# Patient Record
Sex: FEMALE | Race: WHITE | Employment: OTHER | URBAN - METROPOLITAN AREA
[De-identification: names, ages, dates, MRNs, and addresses within clinical notes are randomized per-mention and may not be internally consistent; named-entity substitution may affect disease eponyms.]

---

## 2020-09-28 ENCOUNTER — HOSPITAL ENCOUNTER (OUTPATIENT)
Dept: CT IMAGING | Age: 70
Discharge: HOME | End: 2020-09-28
Payer: MEDICARE

## 2020-09-28 PROCEDURE — 74176 CT ABD & PELVIS W/O CONTRAST: CPT

## 2021-01-06 ENCOUNTER — HOSPITAL ENCOUNTER (OUTPATIENT)
Age: 71
Discharge: HOME | End: 2021-01-06
Payer: MEDICARE

## 2021-01-06 ENCOUNTER — HOSPITAL ENCOUNTER (OUTPATIENT)
Dept: ULTRASOUND IMAGING | Age: 71
Discharge: HOME | End: 2021-01-06
Payer: MEDICARE

## 2021-01-06 DIAGNOSIS — N20.0 CALCULUS OF KIDNEY: ICD-10-CM

## 2021-01-06 LAB
ANION GAP SERPL CALCULATED.3IONS-SCNC: 13 MMOL/L (ref 3–16)
BUN BLDV-MCNC: 16 MG/DL (ref 7–20)
CALCIUM SERPL-MCNC: 9.2 MG/DL (ref 8.3–10.6)
CHLORIDE BLD-SCNC: 99 MMOL/L (ref 99–110)
CO2: 26 MMOL/L (ref 21–32)
CREAT SERPL-MCNC: <0.5 MG/DL (ref 0.6–1.2)
GFR AFRICAN AMERICAN: >60
GFR NON-AFRICAN AMERICAN: >60
GLUCOSE BLD-MCNC: 193 MG/DL (ref 70–99)
POTASSIUM SERPL-SCNC: 3.7 MMOL/L (ref 3.5–5.1)
SODIUM BLD-SCNC: 138 MMOL/L (ref 136–145)

## 2021-01-06 PROCEDURE — 80048 BASIC METABOLIC PNL TOTAL CA: CPT

## 2021-01-06 PROCEDURE — 36415 COLL VENOUS BLD VENIPUNCTURE: CPT

## 2021-01-06 PROCEDURE — 76770 US EXAM ABDO BACK WALL COMP: CPT

## 2022-01-26 ENCOUNTER — APPOINTMENT (OUTPATIENT)
Dept: GENERAL RADIOLOGY | Age: 72
End: 2022-01-26
Payer: MEDICARE

## 2022-01-26 ENCOUNTER — HOSPITAL ENCOUNTER (EMERGENCY)
Age: 72
Discharge: HOME | End: 2022-01-26
Attending: EMERGENCY MEDICINE
Payer: MEDICARE

## 2022-01-26 VITALS
HEIGHT: 62 IN | BODY MASS INDEX: 41.87 KG/M2 | WEIGHT: 227.51 LBS | RESPIRATION RATE: 18 BRPM | OXYGEN SATURATION: 98 % | HEART RATE: 94 BPM | SYSTOLIC BLOOD PRESSURE: 141 MMHG | TEMPERATURE: 97.9 F | DIASTOLIC BLOOD PRESSURE: 86 MMHG

## 2022-01-26 DIAGNOSIS — S61.411A LACERATION OF MULTIPLE SITES OF RIGHT HAND AND FINGERS, INITIAL ENCOUNTER: ICD-10-CM
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
## 2022-01-26 DIAGNOSIS — S61.219A LACERATION OF MULTIPLE SITES OF RIGHT HAND AND FINGERS, INITIAL ENCOUNTER: ICD-10-CM

## 2022-01-26 PROCEDURE — 6370000000 HC RX 637 (ALT 250 FOR IP): Performed by: EMERGENCY MEDICINE

## 2022-01-26 PROCEDURE — 12002 RPR S/N/AX/GEN/TRNK2.6-7.5CM: CPT

## 2022-01-26 PROCEDURE — 73130 X-RAY EXAM OF HAND: CPT

## 2022-01-26 PROCEDURE — 99283 EMERGENCY DEPT VISIT LOW MDM: CPT

## 2022-01-26 RX ORDER — LISINOPRIL 30 MG/1
30 TABLET ORAL DAILY
COMMUNITY
Start: 2021-12-30

## 2022-01-26 RX ORDER — AMOXICILLIN AND CLAVULANATE POTASSIUM 875; 125 MG/1; MG/1
1 TABLET, FILM COATED ORAL 2 TIMES DAILY
Qty: 20 TABLET | Refills: 0 | Status: SHIPPED | OUTPATIENT
Start: 2022-01-26 | End: 2022-02-05

## 2022-01-26 RX ORDER — FERROUS SULFATE 325(65) MG
325 TABLET ORAL
COMMUNITY
Start: 2021-11-26

## 2022-01-26 RX ORDER — ERGOCALCIFEROL (VITAMIN D2) 1250 MCG
CAPSULE ORAL
COMMUNITY
Start: 2022-01-17

## 2022-01-26 RX ORDER — ACETAMINOPHEN 500 MG
1000 TABLET ORAL EVERY 6 HOURS PRN
COMMUNITY

## 2022-01-26 RX ORDER — GLIPIZIDE 5 MG/1
5 TABLET, FILM COATED, EXTENDED RELEASE ORAL
COMMUNITY
Start: 2021-09-09

## 2022-01-26 RX ORDER — HYDROXYCHLOROQUINE SULFATE 200 MG/1
200 TABLET, FILM COATED ORAL 2 TIMES DAILY
COMMUNITY
Start: 2021-10-05

## 2022-01-26 RX ORDER — GLIPIZIDE 10 MG/1
10 TABLET, FILM COATED, EXTENDED RELEASE ORAL DAILY
COMMUNITY
Start: 2021-12-14

## 2022-01-26 RX ORDER — AMLODIPINE BESYLATE 5 MG/1
5 TABLET ORAL DAILY
COMMUNITY
Start: 2021-12-14

## 2022-01-26 RX ORDER — METFORMIN HYDROCHLORIDE 500 MG/1
TABLET, EXTENDED RELEASE ORAL
COMMUNITY
Start: 2021-11-22

## 2022-01-26 RX ORDER — AMOXICILLIN AND CLAVULANATE POTASSIUM 875; 125 MG/1; MG/1
1 TABLET, FILM COATED ORAL ONCE
Status: COMPLETED | OUTPATIENT
Start: 2022-01-26 | End: 2022-01-26

## 2022-01-26 RX ORDER — OMEPRAZOLE 20 MG/1
20 CAPSULE, DELAYED RELEASE ORAL DAILY
COMMUNITY
Start: 2021-12-30

## 2022-01-26 RX ORDER — AMOXICILLIN AND CLAVULANATE POTASSIUM 875; 125 MG/1; MG/1
1 TABLET, FILM COATED ORAL EVERY 12 HOURS SCHEDULED
Status: DISCONTINUED | OUTPATIENT
Start: 2022-01-26 | End: 2022-01-26

## 2022-01-26 RX ADMIN — AMOXICILLIN AND CLAVULANATE POTASSIUM 1 TABLET: 875; 125 TABLET, FILM COATED ORAL at 18:04

## 2022-01-26 ASSESSMENT — ENCOUNTER SYMPTOMS
GASTROINTESTINAL NEGATIVE: 1
ABDOMINAL PAIN: 0
SHORTNESS OF BREATH: 0
RESPIRATORY NEGATIVE: 1
SORE THROAT: 0

## 2022-01-26 ASSESSMENT — PAIN SCALES - GENERAL: PAINLEVEL_OUTOF10: 4

## 2022-01-26 ASSESSMENT — PAIN DESCRIPTION - ORIENTATION: ORIENTATION: RIGHT

## 2022-01-26 ASSESSMENT — PAIN DESCRIPTION - DESCRIPTORS: DESCRIPTORS: ACHING

## 2022-01-26 ASSESSMENT — PAIN DESCRIPTION - LOCATION: LOCATION: HAND

## 2022-01-26 ASSESSMENT — PAIN DESCRIPTION - PAIN TYPE: TYPE: ACUTE PAIN

## 2022-01-26 NOTE — ED NOTES
Laceration cleaned out with copious amounts of irrigation saline and surgical soap per MD instruction. Patient tolerates well.      William English RN  01/26/22 9821

## 2022-01-26 NOTE — ED NOTES
Patient ambulatory from ED. AVS provided and discussed with patient. All questions answered. Patient verbalizes understanding of discharge instructions. Respirations even and easy. No obvious distress at this time. Patient advised to take full course of antibiotics as prescribed, even if symptoms begin to subside. Patient verbalizes understanding.        Rosalie Marina RN  01/26/22 0027

## 2022-01-26 NOTE — ED TRIAGE NOTES
Patient presents to ED complaining of dog bite to right hand, patient refered to ED by Urgent Care. Urgent Care provider states via phone she is unsure if there is tendon involvement or not. MD to bedside for evaluation. Patient states she is up to date on her tetanus and that the dog in question is up to date as well. Multiple puncture wounds noted to right hand. Bleeding controlled on arrival without intervention. Patient resting on bed, respirations even and easy at this time. No obvious distress.

## 2022-01-26 NOTE — ED PROVIDER NOTES
[id number] [facility]  EMERGENCY DEPARTMENTENCOUNTER      Pt Name: Apoorva Mendez  MRN: [id number]  [place] 1950  Date ofevaluation: 1/26/2022  Provider: Chelo Santos MD    CHIEF COMPLAINT       Chief Complaint   Patient presents with    Animal Bite     dog bite to right hand, pateitn refered to ED by Urgent Care. States she is up to date on her shots and that the dog in question is up to date as well. HISTORY OF PRESENT ILLNESS   (Location/Symptom, Timing/Onset,Context/Setting, Quality, Duration, Modifying Factors, Severity)  Note limiting factors. Apoorva Mendez is a 80 y.o. female  who  has a past medical history of Arthritis, COVID-19, Diabetes mellitus ([address] Hyperlipidemia, and Hypertension. 69-year-old female who presents from urgent care after a dog bite which occurred 30 minutes prior to arrival.  Patient having some pain at the site. Symptoms occurred suddenly. Bleeding is now resolved with pressure. Nothing else seems to make her symptoms better. Worse with moving her hand but has no weakness or numbness on her hand. Urgent care sent her here because they were concerned for tendon involvement. No other modifying factors. No other known risk factors. Dog is domesticated no concern for rabies. Patient's tetanus is up-to-date. The history is provided by the patient. No  was used. Animal Bite  Contact animal:  Dog  Time since incident:  30 minutes  Pain details:     Quality:  Aching    Severity:  Mild    Timing:  Constant    Progression:  Improving  Incident location:  Home  Provoked: unprovoked    Tetanus status:  Up to date  Relieved by: Compression. Exacerbated by: Movement. Associated symptoms comment:  None      NursingNotes were reviewed. REVIEW OF SYSTEMS    (2-9 systems for level 4, 10 or more for level 5)     Review of Systems   Constitutional: Negative. HENT: Negative for congestion and sore throat. Respiratory: Negative. Negative for shortness of breath. Cardiovascular: Negative. Negative for chest pain. Gastrointestinal: Negative. Negative for abdominal pain. Genitourinary: Negative. Skin: Positive for wound. Neurological: Negative. Negative for headaches. Hematological: Does not bruise/bleed easily. Except as noted above the remainder of the review of systems was reviewed and negative. PAST MEDICAL HISTORY     Past Medical History:   Diagnosis Date    Arthritis     Reumitoid    COVID-19     Diabetes mellitus ([facility] [address]     Hyperlipidemia     Hypertension          SURGICALHISTORY     No past surgical history on file. CURRENT MEDICATIONS       Previous Medications    ACETAMINOPHEN (TYLENOL) 500 MG TABLET    Take 1,000 mg by mouth every 6 hours as needed    AMLODIPINE (NORVASC) 5 MG TABLET    Take 5 mg by mouth daily    ERGOCALCIFEROL (ERGOCALCIFEROL) 1.25 MG (21496 UT) CAPSULE    TAKE 1 CAPSULE BY MOUTH 1 TIME A WEEK    FERROUS SULFATE (IRON 325) 325 (65 FE) MG TABLET    Take 325 mg by mouth daily (with breakfast)    GLIPIZIDE (GLUCOTROL XL) 10 MG EXTENDED RELEASE TABLET    Take 10 mg by mouth daily    GLIPIZIDE (GLUCOTROL XL) 5 MG EXTENDED RELEASE TABLET    Take 5 mg by mouth daily (with breakfast)    HYDROXYCHLOROQUINE (PLAQUENIL) 200 MG TABLET    Take 200 mg by mouth 2 times daily    LISINOPRIL (PRINIVIL;ZESTRIL) 30 MG TABLET    Take 30 mg by mouth daily    METFORMIN (GLUCOPHAGE-XR) 500 MG EXTENDED RELEASE TABLET    TAKE 2 TABLETS BY MOUTH TWICE DAILY    OMEPRAZOLE (PRILOSEC) 20 MG DELAYED RELEASE CAPSULE    Take 20 mg by mouth daily            Patient has no known allergies. FAMILY HISTORY     No family history on file.        SOCIAL HISTORY       Social History     Socioeconomic History    Marital status: Single     Spouse name: Not on file    Number of children: Not on file    Years of education: Not on file    Highest education level: Not on file   Occupational History    Not on file   Tobacco Use    Smoking status: Never Smoker    Smokeless tobacco: Never Used   Vaping Use    Vaping Use: Never used   Substance and Sexual Activity    Alcohol use: Never    Drug use: Never    Sexual activity: Not on file   Other Topics Concern    Not on file   Social History Narrative    Not on file     Social Determinants of Health     Financial Resource Strain:     Difficulty of Paying Living Expenses: Not on file   Food Insecurity:     Worried About Running Out of Food in the Last Year: Not on file    Imtiaz of Food in the Last Year: Not on file   Transportation Needs:     Lack of Transportation (Medical): Not on file    Lack of Transportation (Non-Medical): Not on file   Physical Activity:     Days of Exercise per Week: Not on file    Minutes of Exercise per Session: Not on file   Stress:     Feeling of Stress : Not on file   Social Connections:     Frequency of Communication with Friends and Family: Not on file    Frequency of Social Gatherings with Friends and Family: Not on file    Attends Mandaen Services: Not on file    Active Member of [address] [place] [address] or Organizations: Not on file    Attends Club or Organization Meetings: Not on file    Marital Status: Not on file   Intimate Partner Violence:     Fear of Current or Ex-Partner: Not on file    Emotionally Abused: Not on file    Physically Abused: Not on file    Sexually Abused: Not on file   Housing Stability:     Unable to Pay for Housing in the Last Year: Not on file    Number of [place] in the Last Year: Not on file    Unstable Housing in the Last Year: Not on file       SCREENINGS             PHYSICAL EXAM    (up to 7 for level 4, 8 or more for level 5)     ED Triage Vitals   BP Temp Temp src Pulse Resp SpO2 Height Weight   -- -- -- -- -- -- -- --       Physical Exam  Vitals and nursing note reviewed. Constitutional:       General: She is not in acute distress. Appearance: Normal appearance.  She is not ill-appearing, toxic-appearing or diaphoretic. HENT:      Head: Normocephalic and atraumatic. Mouth/Throat:      Mouth: Mucous membranes are moist.   Eyes:      Extraocular Movements: Extraocular movements intact. Cardiovascular:      Rate and Rhythm: Normal rate. Pulses: Normal pulses. Pulmonary:      Effort: Pulmonary effort is normal.   Abdominal:      Palpations: Abdomen is soft. Tenderness: There is no abdominal tenderness. Musculoskeletal:      Right hand: Laceration present. No swelling, deformity, tenderness or bony tenderness. Normal range of motion. Normal strength. Normal sensation. There is no disruption of two-point discrimination. Normal capillary refill. Normal pulse. Left hand: No swelling, deformity, lacerations, tenderness or bony tenderness. Normal range of motion. Normal strength. Normal sensation. There is no disruption of two-point discrimination. Normal capillary refill. Normal pulse. Arms:       Comments: Irregular 3 cm laceration to the right hand with some exposed fat. No exposed bone or tendon. Normal strength and sensation in bilateral hands. Normal opposition on the right hand. normal sensation in all nerve distributions. Cap refill normal.   Skin:     General: Skin is warm and dry. Capillary Refill: Capillary refill takes less than 2 seconds. Neurological:      General: No focal deficit present. Mental Status: She is alert. Psychiatric:         Mood and Affect: Mood normal.         RESULTS       RADIOLOGY:   Non-plain filmimages such as CT, Ultrasound and MRI are read by the radiologist.     Interpretation per the Radiologist below, if available at the time ofthis note:    XR HAND RIGHT (MIN 3 VIEWS)   Final Result   No acute osseous injury or radiopaque foreign body.                ED BEDSIDE ULTRASOUND:   Performed by ED Physician - none    LABS:  Labs Reviewed - No data to display    All other labs were within normal range or not returned as of this dictation. EMERGENCY DEPARTMENT COURSE and DIFFERENTIAL DIAGNOSIS/MDM:   Vitals:    Vitals:    01/26/22 1651   BP: (!) 148/92   Pulse: 99   Resp: 16   Temp: 97.9 °F (36.6 °C)   TempSrc: Oral   SpO2: 99%   Weight: 227 lb 8.2 oz (103.2 kg)   Height: 5' 2\" (1.575 m)       Patient was given thefollowing medications:  Medications   amoxicillin-clavulanate (AUGMENTIN) 875-125 MG per tablet 1 tablet (has no administration in time range)       ED COURSE & MEDICAL DECISION MAKING    Pertinent Labs & Imaging studies reviewed. (See chart for details)   -  Patient seen and evaluated in the emergency department. -  Triage and nursing notes reviewed and incorporated. -  Old chart records reviewed and incorporated. -  Differential diagnosis includes: We discussed the issue of likelihood of rabies in the offending animal and risk/benefits of the rabies vaccination. I explained to the patient that there have been no incidences of rabies from dog bites in [place], and that vaccination is currently not recommended by the Health Department. Differential diagnosis: Cellulitis, abscess, foreign body retention (tooth), tetanus, rabies, deep space infection, osteomyelitis, septic joint, other       Patient with laceration of the right hand from a dog bite. Tendons intact. Neurovascularly intact. Normal cap refill. Normal sensation. Normal strength in the hand. We will closed loosely as patient is at high risk of infection however will require some closure as wound is significantly irregular with some exposed subcutaneous fat. Will give patient prophylactic antibiotics as well as instructions to follow-up with hand clinic. See laceration note for further details of procedure and closure. Patient is afebrile not tachycardic saturating well on room air.    -  Work-up included:  See above  -  ED treatment included: See above  -  Results discussed with patient.           REASSESSMENT      The patient is at low risk for mortality based on demographic, history and clinical factors. Given the best available information and clinical assessment, I estimate the risk of hospitalization to be greater than risk of treatment at home. I have explained to the patient that the risk could rapidly change, given precautions for return and instructions. Explained to patient that the risk for mortality is low based on demographic, history and clinical factors. I discussed with patient the results of evaluation in the ED, diagnosis, care, and prognosis. The plan is to discharge to home. Patient is in agreement with plan and questions have been answered. I also discussed with patient the reasons which may require a return visit and the importance of follow-up care. The patient is well-appearing, nontoxic, and improved at the time of discharge. Patient agrees to call to arrange follow-up care as directed. Patient understands to return immediately for worsening/change in symptoms. CRITICAL CARE TIME   Total Critical Care time was 15 minutes, excluding separately reportable procedures. There was a high probability of clinically significant/life threatening deterioration in the patient's condition which required my urgent intervention. CONSULTS:  None    PROCEDURES:  Unless otherwise noted below, none     Lac Repair    Date/Time: 1/26/2022 5:46 PM  Performed by: Mahamed Elmore MD  Authorized by: Mahamed Elmore MD     Consent:     Consent obtained:  Verbal    Consent given by:  Patient    Risks discussed:  Infection, need for additional repair, nerve damage, poor cosmetic result, pain, poor wound healing, tendon damage and vascular damage    Alternatives discussed:  Delayed treatment  Anesthesia (see MAR for exact dosages):      Anesthesia method:  Local infiltration    Local anesthetic:  Lidocaine 1% w/o epi  Laceration details:     Location:  Hand    Hand location:  R hand, dorsum    Length (cm):  3    Depth (mm):  3  Repair type:     Repair type: Intermediate  Pre-procedure details:     Preparation:  Patient was prepped and draped in usual sterile fashion  Exploration:     Hemostasis achieved with:  Direct pressure    Wound extent: areolar tissue violated      Wound extent: no fascia violation noted, no foreign bodies/material noted, no muscle damage noted, no nerve damage noted, no tendon damage noted, no underlying fracture noted and no vascular damage noted      Contaminated: no    Treatment:     Area cleansed with:  Hibiclens    Amount of cleaning:  Extensive    Irrigation solution:  Tap water    Irrigation method:  Tap    Visualized foreign bodies/material removed: no    Skin repair:     Repair method:  Sutures    Suture size:  5-0    Suture material:  Prolene    Suture technique:  Simple interrupted    Number of sutures:  5  Approximation:     Approximation:  Loose  Post-procedure details:     Dressing:  Non-adherent dressing    Patient tolerance of procedure: Tolerated well, no immediate complications        FINAL IMPRESSION      1. Dog bite, initial encounter    2.  Laceration of multiple sites of right hand and fingers, initial encounter          DISPOSITION/PLAN   DISPOSITION        PATIENT REFERREDTO:  Carlita Sales MD  [address] [place] [address]  [phone]    Schedule an appointment as soon as possible for a visit         DISCHARGEMEDICATIONS:  New Prescriptions    AMOXICILLIN-CLAVULANATE (AUGMENTIN) 875-125 MG PER TABLET    Take 1 tablet by mouth 2 times daily for 10 days          (Please note that portions of this note were completed with a voice recognition program.  Efforts were made to edit the dictations but occasionally words are mis-transcribed.)    Ana Soni MD (electronically signed)  Attending Emergency Physician         Ana Soni MD  01/26/22 9543

## 2022-12-22 ENCOUNTER — HOSPITAL ENCOUNTER (EMERGENCY)
Age: 72
Discharge: HOME | End: 2022-12-22
Payer: MEDICARE

## 2022-12-22 ENCOUNTER — APPOINTMENT (OUTPATIENT)
Dept: GENERAL RADIOLOGY | Age: 72
End: 2022-12-22
Payer: MEDICARE

## 2022-12-22 VITALS
OXYGEN SATURATION: 100 % | DIASTOLIC BLOOD PRESSURE: 88 MMHG | HEART RATE: 99 BPM | BODY MASS INDEX: 43.04 KG/M2 | WEIGHT: 233.91 LBS | RESPIRATION RATE: 16 BRPM | SYSTOLIC BLOOD PRESSURE: 158 MMHG | TEMPERATURE: 98.4 F | HEIGHT: 62 IN
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
## 2022-12-22 PROCEDURE — 6370000000 HC RX 637 (ALT 250 FOR IP)

## 2022-12-22 PROCEDURE — 99283 EMERGENCY DEPT VISIT LOW MDM: CPT

## 2022-12-22 PROCEDURE — 73130 X-RAY EXAM OF HAND: CPT

## 2022-12-22 RX ORDER — AMOXICILLIN AND CLAVULANATE POTASSIUM 875; 125 MG/1; MG/1
1 TABLET, FILM COATED ORAL 2 TIMES DAILY
Qty: 14 TABLET | Refills: 0 | Status: SHIPPED | OUTPATIENT
Start: 2022-12-22 | End: 2022-12-29

## 2022-12-22 RX ORDER — BACITRACIN, NEOMYCIN, POLYMYXIN B 400; 3.5; 5 [USP'U]/G; MG/G; [USP'U]/G
OINTMENT TOPICAL ONCE
Status: COMPLETED | OUTPATIENT
Start: 2022-12-22 | End: 2022-12-22

## 2022-12-22 RX ADMIN — BACITRACIN ZINC, NEOMYCIN SULFATE, AND POLYMYXIN B SULFATE: 400; 3.5; 5 OINTMENT TOPICAL at 15:25

## 2022-12-22 ASSESSMENT — ENCOUNTER SYMPTOMS
NAUSEA: 0
CONSTIPATION: 0
SORE THROAT: 0
COUGH: 0
DIARRHEA: 0
EYE PAIN: 0
ABDOMINAL PAIN: 0
RHINORRHEA: 0
VOMITING: 0
BACK PAIN: 0
SHORTNESS OF BREATH: 0

## 2022-12-22 NOTE — DISCHARGE INSTRUCTIONS
Please make sure to keep the wound clean and dry. Return to the ED for any other worsening signs as we discussed.   Please take all the antibiotics until they are gone

## 2022-12-22 NOTE — ED PROVIDER NOTES
[address] [facility] ENCOUNTER        Pt Name: Xiomara Tejeda  MRN: [id number]  [place] 1950  Date of evaluation: 12/22/2022  Provider: ZAMZAM Molina - JANELLE  PCP: Rajeev Burgos   Note Started: 3:56 PM EST12/22/2022       JUAN. I have evaluated this patient. My supervising physician was available for consultation. Triage CHIEF COMPLAINT       Chief Complaint   Patient presents with    Animal Bite     Bitten today by a great nelson. Dog UTD on shots, patient UTD on tetanus shot          HISTORY OF PRESENT ILLNESS   (Location/Symptom, Timing/Onset, Context/Setting, Quality, Duration, Modifying Factors, Severity)  Note limiting factors. Chief Complaint: osmany Tejeda is a 71 y.o. female who presents to the ED with concern for dog bite to her right hand. She is right-hand dominant. This was a known dog to her. The dog is up-to-date on its rabies vaccination. The patient is up-to-date on her tetanus. Nursing Notes were all reviewed and agreed with or any disagreements were addressed in the HPI. REVIEW OF SYSTEMS    (2-9 systems for level 4, 10 or more for level 5)     Review of Systems   Constitutional:  Negative for chills, diaphoresis and fever. HENT:  Negative for congestion, rhinorrhea and sore throat. Eyes:  Negative for pain and visual disturbance. Respiratory:  Negative for cough and shortness of breath. Cardiovascular:  Negative for chest pain and leg swelling. Gastrointestinal:  Negative for abdominal pain, constipation, diarrhea, nausea and vomiting. Genitourinary:  Negative for frequency and hematuria. Musculoskeletal:  Negative for back pain and neck pain. Skin:  Positive for wound. Negative for rash. Neurological:  Negative for dizziness and light-headedness.      PAST MEDICAL HISTORY     Past Medical History:   Diagnosis Date    Arthritis     Reumitoid    COVID-19     Diabetes mellitus ([facility] [address] Hyperlipidemia     Hypertension        SURGICAL HISTORY   History reviewed. No pertinent surgical history. CURRENTMEDICATIONS       Previous Medications    ACETAMINOPHEN (TYLENOL) 500 MG TABLET    Take 1,000 mg by mouth every 6 hours as needed    AMLODIPINE (NORVASC) 5 MG TABLET    Take 5 mg by mouth daily    ERGOCALCIFEROL (ERGOCALCIFEROL) 1.25 MG (72413 UT) CAPSULE    TAKE 1 CAPSULE BY MOUTH 1 TIME A WEEK    FERROUS SULFATE (IRON 325) 325 (65 FE) MG TABLET    Take 325 mg by mouth daily (with breakfast)    GLIPIZIDE (GLUCOTROL XL) 10 MG EXTENDED RELEASE TABLET    Take 10 mg by mouth daily    GLIPIZIDE (GLUCOTROL XL) 5 MG EXTENDED RELEASE TABLET    Take 5 mg by mouth daily (with breakfast)    HYDROXYCHLOROQUINE (PLAQUENIL) 200 MG TABLET    Take 200 mg by mouth 2 times daily    LISINOPRIL (PRINIVIL;ZESTRIL) 30 MG TABLET    Take 30 mg by mouth daily    METFORMIN (GLUCOPHAGE-XR) 500 MG EXTENDED RELEASE TABLET    TAKE 2 TABLETS BY MOUTH TWICE DAILY    OMEPRAZOLE (PRILOSEC) 20 MG DELAYED RELEASE CAPSULE    Take 20 mg by mouth daily       ALLERGIES     Patient has no known allergies. FAMILYHISTORY     History reviewed. No pertinent family history.      SOCIAL HISTORY       Social History     Socioeconomic History    Marital status: Single     Spouse name: None    Number of children: None    Years of education: None    Highest education level: None   Tobacco Use    Smoking status: Never    Smokeless tobacco: Never   Vaping Use    Vaping Use: Never used   Substance and Sexual Activity    Alcohol use: Never    Drug use: Never       SCREENINGS        Roxanne Coma Scale  Eye Opening: Spontaneous  Best Verbal Response: Oriented  Best Motor Response: Obeys commands  Roxanne Coma Scale Score: 15                CIWA Assessment  BP: (!) 163/71  Heart Rate: (!) 105             PHYSICAL EXAM    (up to 7 for level 4, 8 or more for level 5)     ED Triage Vitals [12/22/22 1436]   BP Temp Temp Source Heart Rate Resp SpO2 Height Weight   (!) 163/71 98.3 °F (36.8 °C) Oral (!) 105 18 95 % 5' 2\" (1.575 m) 233 lb 14.5 oz (106.1 kg)       Physical Exam  Vitals and nursing note reviewed. Constitutional:       Appearance: Normal appearance. She is obese. She is not ill-appearing, toxic-appearing or diaphoretic. HENT:      Head: Normocephalic and atraumatic. Right Ear: External ear normal.      Left Ear: External ear normal.      Nose: Nose normal. No congestion or rhinorrhea. Mouth/Throat:      Mouth: Mucous membranes are moist.      Pharynx: Oropharynx is clear. No oropharyngeal exudate or posterior oropharyngeal erythema. Eyes:      General: No scleral icterus. Right eye: No discharge. Left eye: No discharge. Extraocular Movements: Extraocular movements intact. Conjunctiva/sclera: Conjunctivae normal.      Pupils: Pupils are equal, round, and reactive to light. Cardiovascular:      Rate and Rhythm: Normal rate and regular rhythm. Pulses: Normal pulses. Heart sounds: Normal heart sounds. No murmur heard. No friction rub. No gallop. Pulmonary:      Effort: Pulmonary effort is normal. No respiratory distress. Breath sounds: Normal breath sounds. No stridor. No wheezing, rhonchi or rales. Abdominal:      General: Abdomen is flat. Bowel sounds are normal. There is no distension. Palpations: Abdomen is soft. Tenderness: There is no abdominal tenderness. There is no right CVA tenderness, left CVA tenderness or guarding. Musculoskeletal:         General: Tenderness and signs of injury present. Normal range of motion. Cervical back: Normal range of motion and neck supple. No rigidity or tenderness. Comments: There is an irregular wound to the dorsal aspect of her right hand at the base of digit 4. She is neurovascular intact distally with excellent sensation, motor, cap refill. There is also an area of avulsed skin. Skin:     General: Skin is warm and dry. Capillary Refill: Capillary refill takes less than 2 seconds. Coloration: Skin is not jaundiced or pale. Findings: No rash. Neurological:      General: No focal deficit present. Mental Status: She is alert and oriented to person, place, and time. Mental status is at baseline. Psychiatric:         Mood and Affect: Mood normal.         Behavior: Behavior normal.       DIAGNOSTIC RESULTS   LABS:    Labs Reviewed - No data to display    When ordered, only abnormal lab results are displayed. All other labs were within normal range or not returned as of this dictation. EKG: When ordered, EKG's are interpreted by the Emergency Department Physician in the absence of a cardiologist.  Please see their note for interpretation of EKG. RADIOLOGY:   Non-plain film images such as CT, Ultrasound and MRI are read by the radiologist. Plain radiographic images are visualized and preliminarily interpreted by the  ED Provider with the below findings:        Interpretation perthe Radiologist below, if available at the time of this note:    XR HAND RIGHT (MIN 3 VIEWS)   Final Result   Subcutaneous gas density medial aspect of the right hand worst on the dorsal   side which may be related to history of penetrating injury/dog bite however,   necrotizing fasciitis or other soft tissue inflammatory process cannot be   excluded. Subjective skeletal osteopenia, polyarticular moderate-advanced degenerative   arthritic change worst in the interphalangeal joints. No results found. No results found.     PROCEDURES   Unless otherwise noted below, none     Procedures    CRITICAL CARE TIME   N/A    CONSULTS:  None      EMERGENCY DEPARTMENT COURSE and DIFFERENTIAL DIAGNOSIS/MDM:   Vitals:    Vitals:    12/22/22 1436   BP: (!) 163/71   Pulse: (!) 105   Resp: 18   Temp: 98.3 °F (36.8 °C)   TempSrc: Oral   SpO2: 95%   Weight: 233 lb 14.5 oz (106.1 kg)   Height: 5' 2\" (1.575 m)       Patient was given the following medications:  Medications   neomycin-bacitracin-polymyxin (NEOSPORIN) ointment ( Topical Given 12/22/22 0795)         Is this patient to be included in the SEP-1 Core Measure due to severe sepsis or septic shock? No   Exclusion criteria - the patient is NOT to be included for SEP-1 Core Measure due to:  2+ SIRS criteria are not met      Differential diagnosis: Cellulitis, abscess, foreign body retention (tooth), tetanus, rabies, deep space infection, osteomyelitis, septic joint, other     Patient is afebrile and nontoxic in appearance. Plain films as above, no retained foreign body. I'll prescribe Augmentin for prophylaxis and recommend close outpatient follow-up. Of note, the wound was copiously irrigated and cleaned with Shur-Clens and saline. I suspect the air in the wound on the x-ray is secondary to this extensive cleaning. I have low concern for necrotizing fasciitis at this time. Vitals were also obtained at this time. This would explain the patient's mild tachycardia. At the time my exam the patient is not tachycardic. This is a very pleasant patient who has unfortunately been bit by an animal. Nontoxic appearance and in no acute distress. Vitals reviewed and are stable. Tetanus status was addressed. This is a simple wound with no evidence of infection, foreign body or neurovascular compromise. They understand that it is best for the wound to heal by secondary intention due to risk of infection and that antibiotics are indicated and will be started today. They verbalized comprehension of the signs and symptoms of infection and importance of close follow-up. They were counseled to return immediately if worse or, specifically, a worsening of pain, redness, swelling, numbness, fever, or loss of function. The diagnosis, plan, expected course, follow-up, and return precautions were discussed and all questions were answered. Impression-    1.  Dog bite, initial encounter DISPOSITION/PLAN   DISPOSITION Decision To Discharge 12/22/2022 03:58:49 PM      PATIENT REFERREDTO:  N[facility]  [address] [id number]  [phone]    Schedule an appointment as soon as possible for a visit in 3 days  Follow up within 3 days, Return to ED sooner if symptoms worsen    DISCHARGE MEDICATIONS:  New Prescriptions    AMOXICILLIN-CLAVULANATE (AUGMENTIN) 875-125 MG PER TABLET    Take 1 tablet by mouth 2 times daily for 7 days       DISCONTINUED MEDICATIONS:  Discontinued Medications    No medications on file              (Please note that portions ofthis note were completed with a voice recognition program.  Efforts were made to edit the dictations but occasionally words are mis-transcribed.)    ZAMZAM Childress CNP (electronically signed)             ZAMZAM Childress CNP  12/22/22 6313

## 2023-01-14 ENCOUNTER — APPOINTMENT (OUTPATIENT)
Dept: GENERAL RADIOLOGY | Age: 73
End: 2023-01-14
Payer: MEDICARE

## 2023-01-14 ENCOUNTER — HOSPITAL ENCOUNTER (EMERGENCY)
Age: 73
Discharge: HOME | End: 2023-01-14
Attending: EMERGENCY MEDICINE
Payer: MEDICARE

## 2023-01-14 ENCOUNTER — APPOINTMENT (OUTPATIENT)
Dept: CT IMAGING | Age: 73
End: 2023-01-14
Payer: MEDICARE

## 2023-01-14 VITALS
RESPIRATION RATE: 16 BRPM | SYSTOLIC BLOOD PRESSURE: 167 MMHG | TEMPERATURE: 98.7 F | BODY MASS INDEX: 40.75 KG/M2 | OXYGEN SATURATION: 96 % | DIASTOLIC BLOOD PRESSURE: 80 MMHG | WEIGHT: 230 LBS | HEART RATE: 91 BPM | HEIGHT: 63 IN
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
## 2023-01-14 DIAGNOSIS — S62.347A CLOSED NONDISPLACED FRACTURE OF BASE OF FIFTH METACARPAL BONE OF LEFT HAND, INITIAL ENCOUNTER: ICD-10-CM

## 2023-01-14 DIAGNOSIS — S52.501A FRACTURE OF RADIUS AND ULNA NEAR WRIST, RIGHT, CLOSED, INITIAL ENCOUNTER: Primary | ICD-10-CM

## 2023-01-14 DIAGNOSIS — S00.83XA CONTUSION OF FACE, INITIAL ENCOUNTER: ICD-10-CM

## 2023-01-14 DIAGNOSIS — S52.601A FRACTURE OF RADIUS AND ULNA NEAR WRIST, RIGHT, CLOSED, INITIAL ENCOUNTER: Primary | ICD-10-CM

## 2023-01-14 PROCEDURE — 70450 CT HEAD/BRAIN W/O DYE: CPT

## 2023-01-14 PROCEDURE — 73130 X-RAY EXAM OF HAND: CPT

## 2023-01-14 PROCEDURE — 72125 CT NECK SPINE W/O DYE: CPT

## 2023-01-14 PROCEDURE — 29125 APPL SHORT ARM SPLINT STATIC: CPT

## 2023-01-14 PROCEDURE — 6370000000 HC RX 637 (ALT 250 FOR IP): Performed by: EMERGENCY MEDICINE

## 2023-01-14 PROCEDURE — 70486 CT MAXILLOFACIAL W/O DYE: CPT

## 2023-01-14 PROCEDURE — 73110 X-RAY EXAM OF WRIST: CPT

## 2023-01-14 PROCEDURE — 99284 EMERGENCY DEPT VISIT MOD MDM: CPT

## 2023-01-14 RX ORDER — OXYCODONE HYDROCHLORIDE AND ACETAMINOPHEN 5; 325 MG/1; MG/1
1 TABLET ORAL EVERY 6 HOURS PRN
Qty: 16 TABLET | Refills: 0 | Status: SHIPPED | OUTPATIENT
Start: 2023-01-14 | End: 2023-01-18

## 2023-01-14 RX ORDER — OXYCODONE HYDROCHLORIDE AND ACETAMINOPHEN 5; 325 MG/1; MG/1
2 TABLET ORAL ONCE
Status: COMPLETED | OUTPATIENT
Start: 2023-01-14 | End: 2023-01-14

## 2023-01-14 RX ADMIN — OXYCODONE AND ACETAMINOPHEN 2 TABLET: 5; 325 TABLET ORAL at 03:59

## 2023-01-14 ASSESSMENT — PAIN - FUNCTIONAL ASSESSMENT
PAIN_FUNCTIONAL_ASSESSMENT: WONG-BAKER FACES
PAIN_FUNCTIONAL_ASSESSMENT: PREVENTS OR INTERFERES SOME ACTIVE ACTIVITIES AND ADLS

## 2023-01-14 ASSESSMENT — PAIN DESCRIPTION - LOCATION: LOCATION: WRIST

## 2023-01-14 ASSESSMENT — PAIN DESCRIPTION - ONSET: ONSET: SUDDEN

## 2023-01-14 ASSESSMENT — PAIN DESCRIPTION - ORIENTATION: ORIENTATION: RIGHT

## 2023-01-14 ASSESSMENT — PAIN SCALES - WONG BAKER
WONGBAKER_NUMERICALRESPONSE: 2
WONGBAKER_NUMERICALRESPONSE: 6

## 2023-01-14 ASSESSMENT — PAIN DESCRIPTION - PAIN TYPE: TYPE: ACUTE PAIN

## 2023-01-14 ASSESSMENT — PAIN DESCRIPTION - DESCRIPTORS: DESCRIPTORS: ACHING

## 2023-01-14 ASSESSMENT — PAIN DESCRIPTION - FREQUENCY: FREQUENCY: CONTINUOUS

## 2023-01-14 NOTE — ED NOTES
Patient gave verbal consent for discharge instructions d/t both hands being in splints at time of d/c.     Sami Sky RN  01/14/23 5489

## 2023-01-14 NOTE — ED NOTES
Volar splint placed on patient's right arm. Splint approved by Dr. Ramu Aldana. Patient placed in sling at this time.       Hamilton Barker RN  01/14/23 7370

## 2023-01-14 NOTE — ED TRIAGE NOTES
Patient to the ER with complaints of a fall that happened approximately 1 hour ago. Patient tripped over her dog and fell hitting her head and right wrist.  Patient has an abrasion to her nose and forehead. She does take baby ASA but denies any other blood thinner use. Alert and oriented x4 at time of triage. [FreeTextEntry2] : Fanta Pleitez MD [FreeTextEntry1] : Dear Fanta,\par \par Thanks for referring Mo Bronson for evaluation of his left ear drainage.  As you know, he is a very pleasant 61 year old man with a long history of chronic ear disease and multiple prior ear surgeries in both ears.  He has a left canal wall down cavity that is prone to recurrent drainage.  Exam today shows a left canal wall down mastoidectomy cavity with relatively constricted meatus, and posteriorly there is ceruminous debris accumulation with purulence.  The right ear has a large canalplasty and lateralized eardrum, with minor dry cerumen that was removed. \par \par We performed a thorough cleaning of his posterior mastoid bowl today in the office, and afterwards applied topical cipro/dexamethasone/clotrimazole powder.  I am hopefully we should be able to get his ear dry with a repeated debridements and applications of topical powder without the need for additional surgical procedures.  I plan to see him back in 3 weeks.\par \par Thank you once again for the opportunity to participate in your patient's care, and I will keep you informed as to his progress.\par \par Best regards,\par \par Pablo Duenas MD\par Otology/Neurotology\par [facility]\par [facility]\par

## 2023-01-14 NOTE — ED PROVIDER NOTES
[facility]    Behzad Bowie MD, am the primary clinician of record. CHIEF COMPLAINT  Chief Complaint   Patient presents with    Fall    Wrist Pain     Right       HISTORY OF PRESENT ILLNESS  Christine English is a 71 y.o. female  who presents to the ED complaining of fall on an outstretched right wrist.  This was mechanical fall in nature. She also had a mild injury to the her left hand and an abrasion and contusion to her forehead and nose but denies any epistaxis or loss of consciousness or vomiting since the injury occurred. She is very clear that it was a mechanical trip and fall but not a dizzy spell. She primarily is here for her right wrist which she sustained the brunt of the fall. She is not anticoagulated. She denies neck or back pain. H/o rheumatoid arthritis. No other complaints, modifying factors or associated symptoms. I have reviewed the following from the nursing documentation. Past Medical History:   Diagnosis Date    Arthritis     Reumitoid    COVID-19     Diabetes mellitus ([facility] [address]     Hyperlipidemia     Hypertension     Kidney stones     Rheumatoid arthritis ([facility] [address]      Past Surgical History:   Procedure Laterality Date    CORONARY ANGIOPLASTY WITH STENT PLACEMENT       History reviewed. No pertinent family history.   Social History     Socioeconomic History    Marital status: Single     Spouse name: Not on file    Number of children: Not on file    Years of education: Not on file    Highest education level: Not on file   Occupational History    Not on file   Tobacco Use    Smoking status: Former     Types: Cigarettes    Smokeless tobacco: Never    Tobacco comments:     Quit smoking 2013   Vaping Use    Vaping Use: Never used   Substance and Sexual Activity    Alcohol use: Never    Drug use: Never    Sexual activity: Not on file   Other Topics Concern    Not on file   Social History Narrative    Not on file     Social Determinants of Health Financial Resource Strain: Not on file   Food Insecurity: Not on file   Transportation Needs: Not on file   Physical Activity: Not on file   Stress: Not on file   Social Connections: Not on file   Intimate Partner Violence: Not on file   Housing Stability: Not on file     No current facility-administered medications for this encounter. Current Outpatient Medications   Medication Sig Dispense Refill    oxyCODONE-acetaminophen (PERCOCET) 5-325 MG per tablet Take 1 tablet by mouth every 6 hours as needed for Pain (CAUTION: Can cause dizziness, don't drive while taking.) for up to 4 days. Max Daily Amount: 4 tablets 16 tablet 0    acetaminophen (TYLENOL) 500 MG tablet Take 1,000 mg by mouth every 6 hours as needed      amLODIPine (NORVASC) 5 MG tablet Take 5 mg by mouth daily      ergocalciferol (ERGOCALCIFEROL) 1.25 MG (29989 UT) capsule TAKE 1 CAPSULE BY MOUTH 1 TIME A WEEK      ferrous sulfate (IRON 325) 325 (65 Fe) MG tablet Take 325 mg by mouth daily (with breakfast)      glipiZIDE (GLUCOTROL XL) 10 MG extended release tablet Take 10 mg by mouth daily      glipiZIDE (GLUCOTROL XL) 5 MG extended release tablet Take 5 mg by mouth daily (with breakfast)      hydroxychloroquine (PLAQUENIL) 200 MG tablet Take 200 mg by mouth 2 times daily      lisinopril (PRINIVIL;ZESTRIL) 30 MG tablet Take 30 mg by mouth daily      metFORMIN (GLUCOPHAGE-XR) 500 MG extended release tablet TAKE 2 TABLETS BY MOUTH TWICE DAILY      omeprazole (PRILOSEC) 20 MG delayed release capsule Take 20 mg by mouth daily       No Known Allergies    REVIEW OF SYSTEMS  6 systems reviewed, pertinent positives per HPI otherwise noted to be negative    PHYSICAL EXAM   BP (!) 186/83   Pulse 94   Temp 98.7 °F (37.1 °C) (Oral)   Resp 16   Ht 5' 3\" (1.6 m)   Wt 230 lb (104.3 kg)   SpO2 97%   BMI 40.74 kg/m²    GENERAL APPEARANCE: Awake and alert. Cooperative. No acute distress. HEAD: Normocephalic.  Abrasion/contusion to forehead and nasal bridge. No septal deviation/hematoma. No laceration or Marshall's sign or raccoon eyes. EYES: PERRL. EOM's grossly intact. ENT: Mucous membranes are moist. No dental or intranasal injury evident, nasal abrasion as noted above. NECK: Supple. Normal ROM. BACK:      Cervical: no tenderness noted, no midline tenderness, no paraspinous spasm      Thoracic: no tenderness noted, no midline tenderness, no paraspinous spasm      Lumbar: no tenderness noted, no midline tenderness, no paraspinous spasm  CHEST: Equal symmetric chest rise. RRR, no chest wall ttp  LUNGS: Breathing is unlabored. Speaking comfortably in full sentences. CTAB  ABDOMEN: Nondistended, nontender, pelvis stable  MUSCULOSKELETAL:  RUE: Closed deformity with tenderness swelling and bruising to the wrist mostly over the radial aspect but also the ulnar aspect. No hand or finger tenderness noted, no proximal forearm, elbow or upper arm tenderness. 2+ radial pulse. Brisk cap refill x5 digits. Sensation and motor function fully intact in the radial, ulnar, and median nerve distribution. Full range of motion of all major joints except wrist as limited by pain/injury. Cardinal movements of hand fully intact. No other erythema, bruising, or lacerations. Comparments are soft. LUE: Mild ttp over the hypothenar eminence/5th metacarpal, no other LUE tenderness. 2+ radial pulse. Brisk cap refill x5 digits. Sensation and motor function fully intact in the radial, ulnar, and median nerve distribution. Full range of motion of all major joints. Cardinal movements of hand fully intact. No erythema, bruising, or lacerations. Comparments are soft. RLE: No tenderness. 2+ DP and PT. Sensation and motor function fully intact. Full range of motion of all major joints. No erythema, bruising, or lacerations. Compartments are soft. 2+ patellar reflex. Achilles nontender and intact. Able to bear weight. No joint swelling or effusions are noted.   LLE: No tenderness. 2+ DP and PT. Sensation and motor function fully intact. Full range of motion of all major joints. No erythema, bruising, or lacerations. Compartments are soft. 2+ patellar reflex. Achilles nontender and intact. Able to bear weight. No joint swelling or effusions are noted. SKIN: Warm and dry. No acute rashes. NEUROLOGICAL: Alert and oriented. Strength is 5/5 in all extremities and sensation is intact. Gait normal.        EKG performed in ED:  None      RADIOLOGY  Any applicable radiology studies including x-ray, CT, MRI, and/or ultrasound, were reviewed independently by me in addition to the radiologist.  I reviewed all radiology images and reports as well from this evaluation. XR WRIST RIGHT (MIN 3 VIEWS)    Result Date: 1/14/2023  Comminuted impacted fracture of the distal radial metaphysis. Ulnar styloid fracture. XR HAND LEFT (MIN 3 VIEWS)    Result Date: 1/14/2023  Fracture at the base of the 5th metacarpal.     XR HAND LEFT (MIN 3 VIEWS)    Result Date: 1/14/2023  Comminuted impacted fracture of the distal radial metaphysis. Ulnar styloid fracture. CT HEAD WO CONTRAST    Result Date: 1/14/2023  Atrophy and chronic microvascular disease without acute intracranial abnormality. CT CERVICAL SPINE WO CONTRAST    Result Date: 1/14/2023  No acute fracture or malalignment of the cervical spine. Degenerative change most pronounced at C5-6 and C6-7 with bilateral foraminal narrowing. CT MAXILLOFACIAL WO CONTRAST    Result Date: 1/14/2023  No acute facial bone trauma. ED COURSE/MDM  Patient seen and evaluated. Old records reviewed. Labs and imaging reviewed.     After initial evaluation, differential diagnostic considerations included but not limited to: intracranial injury, cervical spine injury, chest/abdominal organ injury, extremity injury, abrasion/laceration, contusion, fracture, sprain/strain, dislocation    The patient's ED workup was notable for mechanical fall on her outstretched right wrist which took the brunt of her injury. X-ray does demonstrate a comminuted distal radius fracture and ulnar styloid fracture. She also has a facial nasal and forehead abrasion with contusion present but CT scans of the head, maxillofacial bones and cervical spine are all negative for any acute traumatic abnormalities otherwise. The patient does not have any other injuries at this time warranting imaging except for pain in the left hand. X-ray of the left hand demonstrates base of the 5th metacarpal fracture. Of note, I initially ordered a left hand x-ray and a right wrist x-ray. Radiology tech performed a right wrist and right hand x-ray inadvertently. She was later taken back to radiology and did obtain the left hand imaging as originally intended. Patient is neurovascularly intact but the R wrist appears potentially surgical.  As such, orthopedics was consulted to coordinate splinting and f/u plan (see below). We discussed the logistics of bilateral upper extremity fractures including the left fifth metacarpal and the right radius and ulna. She wants to attempt outpatient management. We discussed that she can return to the ED to consider PT OT/admission etc. if she is unable to ambulate or use her cane, she is unable to perform ADLs or have any other concerns with logistical functionality of her injuries. Under my direction a volar splint was placed on the RUE by myself and the ED RN. I have examined the splint thoroughly for functionality. Extremity is distally neurovascularly intact with movement of digits, normal sensation and brisk cap refill. We discussed splint precautions including development of worsening swelling, pain, numbness, tingling, or weakness. Sling provided as well. Under my direction an ulnar gutter splint was placed on the LUE by myself the ED RN. I have examined the splint thoroughly for functionality.  Extremity is distally neurovascularly intact with movement of digits, normal sensation and brisk cap refill. We discussed splint precautions including development of worsening swelling, pain, numbness, tingling, or weakness. Consults:  Mingo HAWLEY with orthopedics covering with Dr. Robert Suero was consulted about the patient's ED history, physical, workup, and course so far. Recommendations from this consultant included agreement with plan of volar splint, sling, and recommended f/u with Dr. Robert Suero at [address] [facility] on Monday morning at the [facility] office. History obtained from: Patient and Family (sister)    Pertinent social determinants of health: None applicable    Chronic conditions potentially affecting care:  rheumatoid arthritis    Review of other records:  None    Reassessment:  Given PO percocet and then on reassessment, feeling a little better. During the patient's ED course, the patient was given:  Medications   oxyCODONE-acetaminophen (PERCOCET) 5-325 MG per tablet 2 tablet (2 tablets Oral Given 1/14/23 0359)        CLINICAL IMPRESSION  1. Fracture of radius and ulna near wrist, right, closed, initial encounter    2. Contusion of face, initial encounter    3. Fall on same level from slipping, tripping or stumbling, initial encounter    4. Closed nondisplaced fracture of base of fifth metacarpal bone of left hand, initial encounter        Blood pressure (!) 186/83, pulse 94, temperature 98.7 °F (37.1 °C), temperature source Oral, resp. rate 16, height 5' 3\" (1.6 m), weight 230 lb (104.3 kg), SpO2 97 %. DISPOSITION  Burns Brunner was discharged in stable condition. I have discussed the findings of today's workup with the patient and addressed the patient's questions and concerns. Important warning signs as well as new or worsening symptoms which would necessitate immediate return to the ED were discussed. The plan is to discharge from the ED at this time, and the patient is in stable condition.   The patient acknowledged understanding is agreeable with this plan. Patient was given scripts for the following medications. I counseled patient how to take these medications. New Prescriptions    OXYCODONE-ACETAMINOPHEN (PERCOCET) 5-325 MG PER TABLET    Take 1 tablet by mouth every 6 hours as needed for Pain (CAUTION: Can cause dizziness, don't drive while taking.) for up to 4 days. Max Daily Amount: 4 tablets       Follow-up with: Juju Harding MD  [address] [place] [address]  [address] [place] [address]  [phone]    Go on 1/16/2023  For symptom re-evaluation, as already scheduled at 8:30am    Critical Care Time:  None    DISCLAIMER: This chart was created using Dragon dictation software. Efforts were made by me to ensure accuracy, however some errors may be present due to limitations of this technology and occasionally words are not transcribed correctly.         Braxton Kirkpatrick MD  01/14/23 535 Ketan Rg MD  01/14/23 0808

## 2023-01-16 ENCOUNTER — TELEPHONE (OUTPATIENT)
Dept: ORTHOPEDIC SURGERY | Age: 73
End: 2023-01-16

## 2023-01-16 ENCOUNTER — OFFICE VISIT (OUTPATIENT)
Dept: ORTHOPEDIC SURGERY | Age: 73
End: 2023-01-16

## 2023-01-16 VITALS — BODY MASS INDEX: 40.75 KG/M2 | WEIGHT: 230 LBS | RESPIRATION RATE: 16 BRPM | HEIGHT: 63 IN

## 2023-01-16 DIAGNOSIS — S52.501A TRAUMATIC CLOSED DISPLACED FRACTURE OF DISTAL END OF RADIUS, RIGHT, INITIAL ENCOUNTER: Primary | ICD-10-CM

## 2023-01-16 DIAGNOSIS — M06.9 RHEUMATOID ARTHRITIS, INVOLVING UNSPECIFIED SITE, UNSPECIFIED WHETHER RHEUMATOID FACTOR PRESENT (HCC): ICD-10-CM

## 2023-01-16 DIAGNOSIS — S52.611A TRAUMATIC CLOSED FRACTURE OF ULNAR STYLOID WITH MINIMAL DISPLACEMENT, RIGHT, INITIAL ENCOUNTER: ICD-10-CM

## 2023-01-16 DIAGNOSIS — S62.347A CLOSED NONDISPLACED FRACTURE OF BASE OF FIFTH METACARPAL BONE OF LEFT HAND, INITIAL ENCOUNTER: ICD-10-CM

## 2023-01-16 RX ORDER — ASPIRIN 81 MG/1
81 TABLET ORAL DAILY
COMMUNITY

## 2023-01-16 RX ORDER — METHOTREXATE 2.5 MG/1
TABLET ORAL
COMMUNITY
Start: 2022-11-01

## 2023-01-16 NOTE — LETTER
Dr Negrita Arroyo [phone] F: [phone]  Surgery Scheduling Form:  DEMOGRAPHICS:                                                                                                              .  Patient Name:  Dee Haque    Patient :  1950   Patient SS#:  [id number]      Patient Phone:  [phone] (home)      Patient Address:  [facility]    Insurance:  [facility] Medicare    DIAGNOSIS & PROCEDURE:                                                                                                Diagnosis:  Right distal radius fracture S52.501A    Operation:  Open reduction internal fixation right distal radius fracture, possible bridge  Plate  09032    [facility]  [facility]    Surgeon:  Sheila Smith    SCHEDULING INFORMATION:                                                                                         .    Requested Date: 23   OR Time: 7:30am  Patient Arrival Time: 6:00am     OR Time Required:  75 Minutes    Anesthesia:  General       Equipment:  Synthes, mini C-arm      Status:  outpatient PAT Required:  Yes      Latex Allergy:  no Defibrilator or Pacemaker:  no    Isolation Precautions:  no                         Gurwinder Shell MD     23   BILLING INFORMATION:                                                                                                    .                             CPT Code Modifier  ORIF    Prior auth:  [id number]

## 2023-01-16 NOTE — FLOWSHEET NOTE
Preoperative Screening for Elective Surgery/Invasive Procedures While COVID-19 present in the community     Have you tested positive or have been told to self-isolate for COVID-19 like symptoms within the past 28 days? Do you currently have any of the following symptoms? Fever >100.0 F or 99.9 F in immunocompromised patients? New onset cough, shortness of breath or difficulty breathing? New onset sore throat, myalgia (muscle aches and pains), headache, loss of taste/smell or diarrhea? Have you had a potential exposure to COVID-19 within the past 14 days by:  Close contact with a confirmed case? Close contact with a healthcare worker,  or essential infrastructure worker (grocery store, [employer], gas station, public utilities or transportation)? Do you reside in a congregate setting such as; skilled nursing facility, adult home, correctional facility, homeless shelter or other institutional setting? Have you had recent travel to a known COVID-19 hotspot? No to all above       * Admitted with diarrhea? [] YES    [x]  NO     *Prior history of C-Diff. In last 3 months? [] YES    [x]  NO     *Antibiotic use in the past 6-8 weeks? [x]  NO    []  YES      If yes, which: REASON_________________     *Prior hospitalization or nursing home in the last month? []  YES    [x]  NO     SAFETY FIRST. .call before you fall    [facility] time______1/19/23 0600______        Surgery time__0730__________    Do not eat or drink anything after 12:00 midnight prior to your surgery. This includes water chewing gum, mints and ice chips- the Day of Surgery. You may brush your teeth and gargle the morning of your surgery, but do not swallow the water     Please see your family doctor/pediatrician for a history and physical and/or questions concerning medications. Bring any test results/reports from your physicians office.    If you are under the care of a heart doctor or specialist doctor, please be aware that you may be asked to them for clearance    You may be asked to stop blood thinners such as Coumadin, Plavix, Fragmin, Lovenox, etc., or any anti-inflammatories such as:  Aspirin, Ibuprofen, Advil, Naproxen prior to your surgery. We also ask that you stop any OTC medications such as fish oil, vitamin E, glucosamine, garlic, Multivitamins, COQ 10, etc.    We ask that you do not smoke 24 hours prior to surgery  We ask that you do not  drink any alcoholic beverages 24 hours prior to surgery     You must make arrangements for a responsible adult to take you home after your surgery. For your safety you will not be allowed to leave alone or drive yourself home. Your surgery will be cancelled if you do not have a ride home. Also for your safety, it is strongly suggested that someone stay with you the first 24 hours after your surgery. A parent or legal guardian must accompany a child scheduled for surgery and plan to stay at the hospital until the child is discharged. Please do not bring other children with you. For your comfort, please wear simple loose fitting clothing to the hospital.  Please do not bring valuables. Do not wear any make-up or nail polish on your fingers or toes. For your safety, please do not wear any jewelry or body piercing's on the day of surgery. All jewelry must be removed. If you have dentures, they will be removed before going to operating room. For your convenience, we will provide you with a container. If you wear contact lenses or glasses, they will be removed, please bring a case for them. If you have a living will and a durable power of  for healthcare, please bring in a copy.      As part of our patient safety program to minimize surgical site infections, we ask you to do the following:    Please notify your surgeon if you develop any illness between         now and the day of your surgery. This includes a cough, cold, fever, sore throat, nausea,         or vomiting, and diarrhea, etc.   Please notify your surgeon if you experience dizziness, shortness         of breath or blurred vision between now and the time of your surgery. Do not shave your operative site 96 hours prior to surgery. For face and neck surgery, men may use an electric razor 48 hours   prior to surgery. You may shower the night before surgery or the morning of   your surgery with an antibacterial soap. You will need to bring a photo ID and insurance card     If you use a C-pap or Bi-pap machine, please bring your machine with you to the hospital     Our goal is to provide you with excellent care, therefore, visitors will be limited to so that we may focus on providing this care for you. Please contact your surgeon office, if you have any further questions. [facility] phone number:  [phone] [facility] fax number:  [phone]    Please note these are generalized instructions for all surgical cases, you may be provided with more specific instructions according to your surgery.

## 2023-01-16 NOTE — TELEPHONE ENCOUNTER
Auth: # [id number]    Date: 01/19/23 thru 04/19/23  Type of SX:  Outpatient  Location: [facility]  CPT: 08444   DX Code: S52.501A  SX area:  Rt wrist  Insurance: [facility] Medicare

## 2023-01-16 NOTE — PROGRESS NOTES
New Patient Wrist Visit  Date: 1/16/2023    CHIEF COMPLAINT: Right wrist pain. HPI: This is a 71 y.o. right hand dominant female with history of rheumatoid arthritis and diabetes who has had right wrist pain for the past 3 days. The patient's injury occurred on 1/13/2023. Patient was trying to avoid her dog when she tripped and sustained [address] injuries to bilateral upper extremities. Pain is mostly over the global aspect of the wrist and is described as throbbing. The patient denies numbness and tingling of the extremity. The pain is rated as moderate. Any attempted movement of the wrist makes the pain worse. Oxycodone makes the pain better. The patient has also tried volar wrist splint placed in the ED. The patient also complains of left hand pain after the same injury. Pain is located over the ulnar aspect of the hand and is described as throbbing. She does state, however, that the pain is no worse than the chronic pain she has from her RA. She just notices that the pain is worse when lifting things or twisting a doorknob. She was placed in a ulnar gutter splint in the ED, but she removed this herself when she got home because she lives by herself, and was not able to perform ADLs with bilateral splints. Denies numbness and tingling of the left upper extremity. PAST MEDICAL HISTORY:  has a past medical history of Arthritis, COVID-19, Diabetes mellitus ([address] Hyperlipidemia, Hypertension, Kidney stones, and Rheumatoid arthritis ([address] Surgeries:   Past Surgical History:   Procedure Laterality Date    CORONARY ANGIOPLASTY WITH STENT PLACEMENT       Current Medications:   Current Outpatient Medications   Medication Sig Dispense Refill    oxyCODONE-acetaminophen (PERCOCET) 5-325 MG per tablet Take 1 tablet by mouth every 6 hours as needed for Pain (CAUTION: Can cause dizziness, don't drive while taking.) for up to 4 days.  Max Daily Amount: 4 tablets 16 tablet 0    acetaminophen (TYLENOL) 500 MG tablet Take 1,000 mg by mouth every 6 hours as needed      amLODIPine (NORVASC) 5 MG tablet Take 5 mg by mouth daily      ergocalciferol (ERGOCALCIFEROL) 1.25 MG (15614 UT) capsule TAKE 1 CAPSULE BY MOUTH 1 TIME A WEEK      ferrous sulfate (IRON 325) 325 (65 Fe) MG tablet Take 325 mg by mouth daily (with breakfast)      glipiZIDE (GLUCOTROL XL) 10 MG extended release tablet Take 10 mg by mouth daily      glipiZIDE (GLUCOTROL XL) 5 MG extended release tablet Take 5 mg by mouth daily (with breakfast)      hydroxychloroquine (PLAQUENIL) 200 MG tablet Take 200 mg by mouth 2 times daily      lisinopril (PRINIVIL;ZESTRIL) 30 MG tablet Take 30 mg by mouth daily      metFORMIN (GLUCOPHAGE-XR) 500 MG extended release tablet TAKE 2 TABLETS BY MOUTH TWICE DAILY      omeprazole (PRILOSEC) 20 MG delayed release capsule Take 20 mg by mouth daily       No current facility-administered medications for this visit. Allergies: No Known Allergies  FAMILY HISTORY: History reviewed. No pertinent family history. SOCIAL HISTORY:   Social History     Socioeconomic History    Marital status: Single     Spouse name: None    Number of children: None    Years of education: None    Highest education level: None   Tobacco Use    Smoking status: Former     Types: Cigarettes    Smokeless tobacco: Never    Tobacco comments:     Quit smoking 2013   Vaping Use    Vaping Use: Never used   Substance and Sexual Activity    Alcohol use: Never    Drug use: Never       PHYSICAL EXAMINATION:  Resp 16   Ht 5' 3\" (1.6 m)   Wt 230 lb (104.3 kg)   BMI 40.74 kg/m²   Right wrist - Obvious deformity of wrist with mild swelling and ecchymosis into the hand. Skin is intact, clean, and dry. There are no scars or atrophy. There is tenderness to palpation over the entire wrist.  Wrist ROM deferred due to injury. Able to actively move fingers, but unable to make a composite fist due to swelling.   SILT M/U/R/A/LABC nerve distributions. AIN/PIN/IO intact. CR is <2 seconds in all 5 digits. Left hand -skin is intact, clean, and dry. No obvious deformity. There are no scars or atrophy. There is mild swelling and ecchymosis over the lateral aspect of the hand. There is tenderness to palpation over the base of the fifth metacarpal.  Full ROM of wrist and fingers. Able to make a composite fist. SILT M/U/R/A/LABC nerve distributions. AIN/PIN/IO intact. CR is <2 seconds in all 5 digits. X-Rays:   Narrative   EXAMINATION:   3 XRAY VIEWS OF THE RIGHT WRIST       1/14/2023 3:31 am       COMPARISON:   None. HISTORY:   ORDERING SYSTEM PROVIDED HISTORY: trauma suspect fx   TECHNOLOGIST PROVIDED HISTORY:   Reason for exam:->trauma suspect fx   Reason for Exam: fall       FINDINGS:   There is a comminuted and impacted fracture of the distal radial metaphysis. There is an ulnar styloid fracture. The joint spaces are maintained. There   is soft tissue swelling. Impression   Comminuted impacted fracture of the distal radial metaphysis. Ulnar styloid fracture. Narrative   EXAMINATION:   THREE XRAY VIEWS OF THE LEFT HAND       1/14/2023 5:14 am       COMPARISON:   Right hand radiographs performed 01/14/2023. HISTORY:   ORDERING SYSTEM PROVIDED HISTORY: fall, injury   TECHNOLOGIST PROVIDED HISTORY:   Reason for exam:->fall, injury   Reason for Exam: fall, left hand pain       FINDINGS:   There is a fracture at the base of the 5th metacarpal.  There is diffuse   degenerative joint disease. The surrounding soft tissues are unremarkable. Impression   Fracture at the base of the 5th metacarpal.       EXTERNAL NOTE REVIEW:  [facility] ED note by Dr. Pillo Guillaume on 1/14/23 was reviewed. Labs:  No results for input(s): WBC, HGB, HCT, MCV, PLT in the last 720 hours.   Lab Results   Component Value Date     01/06/2021    K 3.7 01/06/2021    CL 99 01/06/2021    CO2 26 01/06/2021    BUN 16 01/06/2021    CREATININE <0.5 (L) 01/06/2021    GLUCOSE 193 (H) 01/06/2021    CALCIUM 9.2 01/06/2021    LABGLOM >60 01/06/2021    GFRAA >60 01/06/2021     No results found for: INR  No results found for: APTT  No results found for: LABA1C  No results found for: VITD25       ASSESSMENT:    ICD-10-CM    1. Traumatic closed displaced fracture of distal end of radius, right, initial encounter  S52.501A       2. Traumatic closed fracture of ulnar styloid with minimal displacement, right, initial encounter  S52.611A       3. Closed nondisplaced fracture of base of fifth metacarpal bone of left hand, initial encounter  S62.347A Tabatha Leal TKO     CANCELED: [place]reg [place] Wrist Brace Long      4. Rheumatoid arthritis, involving unspecified site, unspecified whether rheumatoid factor present ([facility])  M06.9           PLAN:   Displaced, impacted fracture of RIGHT distal radius.  Recommended ORIF of R distal radius. Dr. Shukla discussed the benefits and risks of surgery (see below). Pt would like to proceed.  Splint reapplied.     Nondisplaced left hand 5th MC base fracture.   We will treat this conservatively.   Ulnar gutter brace applied.  Encouraged gentle wrist and finger ROM.  Recommended rest, ice, elevation, and NSAIDs/Tylenol as needed for pain and/or swelling.       Nuria Kaur PA-C  1/16/2023  9:26 AM          SY attending note:  I have seen and examined Rosanne  I agree with Nuria's note  Fall 3 days ago  She tripped over her dog at home  She went to feed her dog and then tripped  Injured both the right wrist and the left hand  She was seen in the emergency department and placed in right splint and was also given a left brace  She is not using the left brace  Her medical history is significant for rheumatoid arthritis, on hydroxychloroquine and methotrexate  She is on baby aspirin only - No other blood thinners  She is a diabetic, on metformin  She does not smoke  She has bilateral knee arthrosis    No open wounds  or skin lesions  She is neurovascular intact to both hands  She has stiffness in the right fingers secondary to the splint and her injury  Associated swelling  Obvious deformity/loss of normal right wrist contour  Tenderness to palpation of the left fifth metacarpal base as well      I have personally reviewed her right wrist and left hand radiographs  I agree with radiologist interpretation      Recommend conservative treatment of the left fifth metacarpal base fracture  Left ulnar gutter wrist brace applied  Recommend open reduction internal fixation of the right comminuted intra-articular distal radius fracture  Risks and benefits of right distal radius fracture operative fixation were discussed at length with the patient and an opportunity for questions was afforded.  Given the severely comminuted nature of the fracture, discussed with her possibility of bridge plate which would necessitate subsequent hardware removal - she is aware.  Possible complications of this surgery/fracture include, but are not limited to, non-union, malunion, persistent pain, post-traumatic arthritis, stiffness, infection, weakness, blood clots, bleeding, neurovascular injury, numbness around the surgical incision, hardware failure, periprosthetic fracture, painful hardware, and wound healing problems. The patient demonstrated a good understanding of the procedure, anticipated outcomes, possible complications, the post-operative restrictions and possible therapy required, and at this time voiced desire to proceed.  An informed consent form was signed in the office and the patient was given pre-operative instructions.  I will see the patient back in clinic for the first post-operative visit.    Today, her right volar wrist splint was cut down to the appropriate length  Well padded and reapplied  Discussed with her performing finger/digital range of motion to prevent stiffness  Right hand/wrist nonweightbearing  Ice, elevation    Surgery  planned for this week  She can continue her rheumatoid medications perioperatively    Jefferson Disla MD

## 2023-01-16 NOTE — FLOWSHEET NOTE
DOS 23   [birth year]    H&P:  RN clarified with Natalie in 's office.  He will do the H&P the DOS    Cardiac note:  Pt. Saw her cardiologist on 22.  Dr. Zuly Edwards noted that if pt  ever needed surgery, she should get a stress test before.  Dr. Shukla was not aware of this.  Natalie in Dr. Shukla's office informed.  She will check with him and let the PAT office know if there are any other tests needed prior to her procedure on 23.      UPDATE: 23  No new orders at this time.

## 2023-01-16 NOTE — TELEPHONE ENCOUNTER
Per Dr Pham -- orders changed to general or regional and sent to scheduling --  also Dr pham will do patient's H&P

## 2023-01-16 NOTE — PROGRESS NOTES
Procedures    Tabatha Leal TKO     Patient was prescribed a Tabatha Leal TKO. The left hand will require stabilization / immobilization from this semi-rigid / rigid orthosis to improve their function. The orthosis will assist in protecting the affected area, provide functional support and facilitate healing. The patient was educated and fit by a healthcare professional with expert knowledge and specialization in brace application while under the direct supervision of the physician. Verbal and written instructions for the use of and application of this item were provided. They were instructed to contact the office immediately should the brace result in increased pain, decreased sensation, increased swelling or worsening of the condition.

## 2023-01-18 ENCOUNTER — ANESTHESIA EVENT (OUTPATIENT)
Dept: OPERATING ROOM | Age: 73
End: 2023-01-18
Payer: MEDICARE

## 2023-01-19 ENCOUNTER — HOSPITAL ENCOUNTER (OUTPATIENT)
Age: 73
Setting detail: OUTPATIENT SURGERY
Discharge: HOME | End: 2023-01-19
Attending: ORTHOPAEDIC SURGERY | Admitting: ORTHOPAEDIC SURGERY
Payer: MEDICARE

## 2023-01-19 ENCOUNTER — ANESTHESIA (OUTPATIENT)
Dept: OPERATING ROOM | Age: 73
End: 2023-01-19
Payer: MEDICARE

## 2023-01-19 ENCOUNTER — APPOINTMENT (OUTPATIENT)
Dept: GENERAL RADIOLOGY | Age: 73
End: 2023-01-19
Attending: ORTHOPAEDIC SURGERY
Payer: MEDICARE

## 2023-01-19 VITALS
OXYGEN SATURATION: 96 % | DIASTOLIC BLOOD PRESSURE: 83 MMHG | TEMPERATURE: 99.1 F | HEART RATE: 101 BPM | HEIGHT: 63 IN | WEIGHT: 233.8 LBS | RESPIRATION RATE: 16 BRPM | BODY MASS INDEX: 41.43 KG/M2 | SYSTOLIC BLOOD PRESSURE: 175 MMHG

## 2023-01-19 DIAGNOSIS — S52.571A OTHER CLOSED INTRA-ARTICULAR FRACTURE OF DISTAL END OF RIGHT RADIUS, INITIAL ENCOUNTER: Primary | ICD-10-CM

## 2023-01-19 PROBLEM — S52.501A CLOSED FRACTURE OF RIGHT DISTAL RADIUS: Status: ACTIVE | Noted: 2023-01-19

## 2023-01-19 LAB
GLUCOSE BLD-MCNC: 228 MG/DL (ref 70–99)
GLUCOSE BLD-MCNC: 247 MG/DL (ref 70–99)
PERFORMED ON: ABNORMAL
PERFORMED ON: ABNORMAL

## 2023-01-19 PROCEDURE — 6360000002 HC RX W HCPCS

## 2023-01-19 PROCEDURE — 7100000011 HC PHASE II RECOVERY - ADDTL 15 MIN: Performed by: ORTHOPAEDIC SURGERY

## 2023-01-19 PROCEDURE — 6360000002 HC RX W HCPCS: Performed by: ORTHOPAEDIC SURGERY

## 2023-01-19 PROCEDURE — 64417 NJX AA&/STRD AX NERVE IMG: CPT | Performed by: ANESTHESIOLOGY

## 2023-01-19 PROCEDURE — 2720000010 HC SURG SUPPLY STERILE: Performed by: ORTHOPAEDIC SURGERY

## 2023-01-19 PROCEDURE — 6370000000 HC RX 637 (ALT 250 FOR IP): Performed by: ANESTHESIOLOGY

## 2023-01-19 PROCEDURE — 7100000001 HC PACU RECOVERY - ADDTL 15 MIN: Performed by: ORTHOPAEDIC SURGERY

## 2023-01-19 PROCEDURE — 7100000000 HC PACU RECOVERY - FIRST 15 MIN: Performed by: ORTHOPAEDIC SURGERY

## 2023-01-19 PROCEDURE — 2580000003 HC RX 258: Performed by: ANESTHESIOLOGY

## 2023-01-19 PROCEDURE — 3600000004 HC SURGERY LEVEL 4 BASE: Performed by: ORTHOPAEDIC SURGERY

## 2023-01-19 PROCEDURE — 6370000000 HC RX 637 (ALT 250 FOR IP): Performed by: ORTHOPAEDIC SURGERY

## 2023-01-19 PROCEDURE — 2580000003 HC RX 258: Performed by: ORTHOPAEDIC SURGERY

## 2023-01-19 PROCEDURE — 6360000002 HC RX W HCPCS: Performed by: ANESTHESIOLOGY

## 2023-01-19 PROCEDURE — 3600000014 HC SURGERY LEVEL 4 ADDTL 15MIN: Performed by: ORTHOPAEDIC SURGERY

## 2023-01-19 PROCEDURE — 2500000003 HC RX 250 WO HCPCS

## 2023-01-19 PROCEDURE — 3700000000 HC ANESTHESIA ATTENDED CARE: Performed by: ORTHOPAEDIC SURGERY

## 2023-01-19 PROCEDURE — 2709999900 HC NON-CHARGEABLE SUPPLY: Performed by: ORTHOPAEDIC SURGERY

## 2023-01-19 PROCEDURE — 3700000001 HC ADD 15 MINUTES (ANESTHESIA): Performed by: ORTHOPAEDIC SURGERY

## 2023-01-19 PROCEDURE — 2500000003 HC RX 250 WO HCPCS: Performed by: ORTHOPAEDIC SURGERY

## 2023-01-19 PROCEDURE — 73110 X-RAY EXAM OF WRIST: CPT

## 2023-01-19 PROCEDURE — C1713 ANCHOR/SCREW BN/BN,TIS/BN: HCPCS | Performed by: ORTHOPAEDIC SURGERY

## 2023-01-19 PROCEDURE — 3209999900 FLUORO FOR SURGICAL PROCEDURES

## 2023-01-19 PROCEDURE — 7100000010 HC PHASE II RECOVERY - FIRST 15 MIN: Performed by: ORTHOPAEDIC SURGERY

## 2023-01-19 PROCEDURE — A4217 STERILE WATER/SALINE, 500 ML: HCPCS | Performed by: ORTHOPAEDIC SURGERY

## 2023-01-19 DEVICE — SCREW BNE L12MM DIA24MM CORT S STL ST T8 STARDRV RECESS: Type: IMPLANTABLE DEVICE | Site: RADIUS | Status: FUNCTIONAL

## 2023-01-19 DEVICE — K WIRE FIX L150MM DIA1.6MM S STL TRCR PNT: Type: IMPLANTABLE DEVICE | Site: RADIUS | Status: FUNCTIONAL

## 2023-01-19 DEVICE — SCREW BNE L20MM DIA2.4MM DST RAD VOLAR S STL ST VAR ANG LOK: Type: IMPLANTABLE DEVICE | Site: RADIUS | Status: FUNCTIONAL

## 2023-01-19 DEVICE — SCREW BNE L14MM DIA2.4MM DST RAD VOLAR S STL ST VAR ANG LOK: Type: IMPLANTABLE DEVICE | Site: RADIUS | Status: FUNCTIONAL

## 2023-01-19 DEVICE — SCREW BNE L16MM DIA2.4MM DST RAD VOLAR S STL ST VAR ANG LOK: Type: IMPLANTABLE DEVICE | Site: RADIUS | Status: FUNCTIONAL

## 2023-01-19 DEVICE — PLATE BNE W22XL54MM STD 9 H R DST RAD VOLAR S STL VAR ANG: Type: IMPLANTABLE DEVICE | Site: RADIUS | Status: FUNCTIONAL

## 2023-01-19 DEVICE — SCREW BNE L12MM DIA2.4MM DST RAD VOLAR S STL ST VAR ANG LOK: Type: IMPLANTABLE DEVICE | Site: RADIUS | Status: FUNCTIONAL

## 2023-01-19 DEVICE — SCREW BNE L12MM DIA2.7MM CORT S STL ST T8 STARDRV RECESS: Type: IMPLANTABLE DEVICE | Site: RADIUS | Status: FUNCTIONAL

## 2023-01-19 DEVICE — SCREW BNE L10MM DIA2.4MM CORT S STL ST T8 STARDRV RECESS: Type: IMPLANTABLE DEVICE | Site: RADIUS | Status: FUNCTIONAL

## 2023-01-19 DEVICE — PLATE BNE STR 2.4X170 MM WRST SS STRL LCP: Type: IMPLANTABLE DEVICE | Site: RADIUS | Status: FUNCTIONAL

## 2023-01-19 DEVICE — SCREW BNE L18MM DIA2.4MM DST RAD VOLAR S STL ST VAR ANG LOK: Type: IMPLANTABLE DEVICE | Site: RADIUS | Status: FUNCTIONAL

## 2023-01-19 RX ORDER — OXYCODONE HYDROCHLORIDE AND ACETAMINOPHEN 5; 325 MG/1; MG/1
1 TABLET ORAL EVERY 8 HOURS PRN
Qty: 21 TABLET | Refills: 0 | Status: SHIPPED | OUTPATIENT
Start: 2023-01-19 | End: 2023-01-26

## 2023-01-19 RX ORDER — SODIUM CHLORIDE 0.9 % (FLUSH) 0.9 %
5-40 SYRINGE (ML) INJECTION PRN
Status: DISCONTINUED | OUTPATIENT
Start: 2023-01-19 | End: 2023-01-19 | Stop reason: HOSPADM

## 2023-01-19 RX ORDER — SODIUM CHLORIDE 9 MG/ML
INJECTION, SOLUTION INTRAVENOUS PRN
Status: DISCONTINUED | OUTPATIENT
Start: 2023-01-19 | End: 2023-01-19 | Stop reason: HOSPADM

## 2023-01-19 RX ORDER — SODIUM CHLORIDE 0.9 % (FLUSH) 0.9 %
5-40 SYRINGE (ML) INJECTION EVERY 12 HOURS SCHEDULED
Status: DISCONTINUED | OUTPATIENT
Start: 2023-01-19 | End: 2023-01-19 | Stop reason: HOSPADM

## 2023-01-19 RX ORDER — ROCURONIUM BROMIDE 10 MG/ML
INJECTION, SOLUTION INTRAVENOUS PRN
Status: DISCONTINUED | OUTPATIENT
Start: 2023-01-19 | End: 2023-01-19 | Stop reason: SDUPTHER

## 2023-01-19 RX ORDER — DEXAMETHASONE SODIUM PHOSPHATE 4 MG/ML
INJECTION, SOLUTION INTRA-ARTICULAR; INTRALESIONAL; INTRAMUSCULAR; INTRAVENOUS; SOFT TISSUE PRN
Status: DISCONTINUED | OUTPATIENT
Start: 2023-01-19 | End: 2023-01-19 | Stop reason: SDUPTHER

## 2023-01-19 RX ORDER — OXYCODONE HYDROCHLORIDE AND ACETAMINOPHEN 5; 325 MG/1; MG/1
1 TABLET ORAL
Status: COMPLETED | OUTPATIENT
Start: 2023-01-19 | End: 2023-01-19

## 2023-01-19 RX ORDER — BUPIVACAINE HYDROCHLORIDE 5 MG/ML
INJECTION, SOLUTION EPIDURAL; INTRACAUDAL
Status: COMPLETED | OUTPATIENT
Start: 2023-01-19 | End: 2023-01-19

## 2023-01-19 RX ORDER — METOPROLOL TARTRATE 5 MG/5ML
INJECTION INTRAVENOUS PRN
Status: DISCONTINUED | OUTPATIENT
Start: 2023-01-19 | End: 2023-01-19 | Stop reason: SDUPTHER

## 2023-01-19 RX ORDER — MIDAZOLAM HYDROCHLORIDE 1 MG/ML
INJECTION INTRAMUSCULAR; INTRAVENOUS PRN
Status: DISCONTINUED | OUTPATIENT
Start: 2023-01-19 | End: 2023-01-19 | Stop reason: SDUPTHER

## 2023-01-19 RX ORDER — OXYCODONE HYDROCHLORIDE AND ACETAMINOPHEN 5; 325 MG/1; MG/1
TABLET ORAL
Status: DISPENSED
Start: 2023-01-19 | End: 2023-01-20

## 2023-01-19 RX ORDER — ROPIVACAINE HYDROCHLORIDE 5 MG/ML
INJECTION, SOLUTION EPIDURAL; INFILTRATION; PERINEURAL
Status: DISCONTINUED | OUTPATIENT
Start: 2023-01-19 | End: 2023-01-19 | Stop reason: SDUPTHER

## 2023-01-19 RX ORDER — CEPHALEXIN 500 MG/1
500 CAPSULE ORAL ONCE
Status: COMPLETED | OUTPATIENT
Start: 2023-01-19 | End: 2023-01-19

## 2023-01-19 RX ORDER — LIDOCAINE HYDROCHLORIDE 20 MG/ML
INJECTION, SOLUTION EPIDURAL; INFILTRATION; INTRACAUDAL; PERINEURAL PRN
Status: DISCONTINUED | OUTPATIENT
Start: 2023-01-19 | End: 2023-01-19 | Stop reason: SDUPTHER

## 2023-01-19 RX ORDER — MAGNESIUM HYDROXIDE 1200 MG/15ML
LIQUID ORAL CONTINUOUS PRN
Status: DISCONTINUED | OUTPATIENT
Start: 2023-01-19 | End: 2023-01-19 | Stop reason: HOSPADM

## 2023-01-19 RX ORDER — FENTANYL CITRATE 50 UG/ML
25 INJECTION, SOLUTION INTRAMUSCULAR; INTRAVENOUS EVERY 5 MIN PRN
Status: DISCONTINUED | OUTPATIENT
Start: 2023-01-19 | End: 2023-01-19 | Stop reason: HOSPADM

## 2023-01-19 RX ORDER — ONDANSETRON 2 MG/ML
4 INJECTION INTRAMUSCULAR; INTRAVENOUS
Status: DISCONTINUED | OUTPATIENT
Start: 2023-01-19 | End: 2023-01-19 | Stop reason: HOSPADM

## 2023-01-19 RX ORDER — ONDANSETRON 2 MG/ML
INJECTION INTRAMUSCULAR; INTRAVENOUS PRN
Status: DISCONTINUED | OUTPATIENT
Start: 2023-01-19 | End: 2023-01-19 | Stop reason: SDUPTHER

## 2023-01-19 RX ORDER — PROPOFOL 10 MG/ML
INJECTION, EMULSION INTRAVENOUS PRN
Status: DISCONTINUED | OUTPATIENT
Start: 2023-01-19 | End: 2023-01-19 | Stop reason: SDUPTHER

## 2023-01-19 RX ORDER — FENTANYL CITRATE 50 UG/ML
INJECTION, SOLUTION INTRAMUSCULAR; INTRAVENOUS PRN
Status: DISCONTINUED | OUTPATIENT
Start: 2023-01-19 | End: 2023-01-19 | Stop reason: SDUPTHER

## 2023-01-19 RX ADMIN — FENTANYL CITRATE 50 MCG: 50 INJECTION INTRAMUSCULAR; INTRAVENOUS at 07:45

## 2023-01-19 RX ADMIN — PROPOFOL 150 MG: 10 INJECTION, EMULSION INTRAVENOUS at 07:37

## 2023-01-19 RX ADMIN — ONDANSETRON 4 MG: 2 INJECTION INTRAMUSCULAR; INTRAVENOUS at 09:18

## 2023-01-19 RX ADMIN — SUGAMMADEX 200 MG: 100 INJECTION, SOLUTION INTRAVENOUS at 09:22

## 2023-01-19 RX ADMIN — HYDROMORPHONE HYDROCHLORIDE 0.5 MG: 1 INJECTION, SOLUTION INTRAMUSCULAR; INTRAVENOUS; SUBCUTANEOUS at 10:05

## 2023-01-19 RX ADMIN — DEXAMETHASONE SODIUM PHOSPHATE 4 MG: 4 INJECTION, SOLUTION INTRAMUSCULAR; INTRAVENOUS at 07:45

## 2023-01-19 RX ADMIN — LIDOCAINE HYDROCHLORIDE 100 MG: 20 INJECTION, SOLUTION EPIDURAL; INFILTRATION; INTRACAUDAL; PERINEURAL at 07:37

## 2023-01-19 RX ADMIN — CEFAZOLIN 2000 MG: 2 INJECTION, POWDER, FOR SOLUTION INTRAMUSCULAR; INTRAVENOUS at 07:35

## 2023-01-19 RX ADMIN — HYDROMORPHONE HYDROCHLORIDE 0.5 MG: 1 INJECTION, SOLUTION INTRAMUSCULAR; INTRAVENOUS; SUBCUTANEOUS at 08:22

## 2023-01-19 RX ADMIN — MIDAZOLAM 1 MG: 1 INJECTION INTRAMUSCULAR; INTRAVENOUS at 07:34

## 2023-01-19 RX ADMIN — PROPOFOL 10 MG: 10 INJECTION, EMULSION INTRAVENOUS at 09:43

## 2023-01-19 RX ADMIN — SODIUM CHLORIDE: 9 INJECTION, SOLUTION INTRAVENOUS at 07:34

## 2023-01-19 RX ADMIN — METOPROLOL TARTRATE 2.5 MG: 5 INJECTION, SOLUTION INTRAVENOUS at 09:38

## 2023-01-19 RX ADMIN — PROPOFOL 10 MG: 10 INJECTION, EMULSION INTRAVENOUS at 09:38

## 2023-01-19 RX ADMIN — HYDROMORPHONE HYDROCHLORIDE 0.5 MG: 1 INJECTION, SOLUTION INTRAMUSCULAR; INTRAVENOUS; SUBCUTANEOUS at 10:21

## 2023-01-19 RX ADMIN — SODIUM CHLORIDE: 9 INJECTION, SOLUTION INTRAVENOUS at 09:24

## 2023-01-19 RX ADMIN — CEPHALEXIN 500 MG: 500 CAPSULE ORAL at 12:44

## 2023-01-19 RX ADMIN — ROPIVACAINE HYDROCHLORIDE 25 ML: 5 INJECTION, SOLUTION EPIDURAL; INFILTRATION; PERINEURAL at 11:08

## 2023-01-19 RX ADMIN — ROCURONIUM BROMIDE 50 MG: 10 INJECTION INTRAVENOUS at 07:38

## 2023-01-19 RX ADMIN — OXYCODONE HYDROCHLORIDE AND ACETAMINOPHEN 1 TABLET: 5; 325 TABLET ORAL at 12:44

## 2023-01-19 RX ADMIN — HYDROMORPHONE HYDROCHLORIDE 0.5 MG: 1 INJECTION, SOLUTION INTRAMUSCULAR; INTRAVENOUS; SUBCUTANEOUS at 07:58

## 2023-01-19 RX ADMIN — FENTANYL CITRATE 50 MCG: 50 INJECTION INTRAMUSCULAR; INTRAVENOUS at 07:37

## 2023-01-19 ASSESSMENT — PAIN SCALES - GENERAL
PAINLEVEL_OUTOF10: 10
PAINLEVEL_OUTOF10: 0
PAINLEVEL_OUTOF10: 10
PAINLEVEL_OUTOF10: 4
PAINLEVEL_OUTOF10: 2
PAINLEVEL_OUTOF10: 4
PAINLEVEL_OUTOF10: 10
PAINLEVEL_OUTOF10: 5

## 2023-01-19 ASSESSMENT — PAIN DESCRIPTION - DESCRIPTORS
DESCRIPTORS: SORE;NUMBNESS
DESCRIPTORS: ACHING
DESCRIPTORS: BURNING;THROBBING

## 2023-01-19 ASSESSMENT — PAIN DESCRIPTION - LOCATION
LOCATION: HAND;WRIST
LOCATION: ARM
LOCATION: HAND;WRIST

## 2023-01-19 ASSESSMENT — PAIN DESCRIPTION - ORIENTATION
ORIENTATION: RIGHT

## 2023-01-19 ASSESSMENT — PAIN DESCRIPTION - PAIN TYPE
TYPE: SURGICAL PAIN
TYPE: ACUTE PAIN;CHRONIC PAIN
TYPE: SURGICAL PAIN

## 2023-01-19 ASSESSMENT — PAIN DESCRIPTION - ONSET
ONSET: ON-GOING

## 2023-01-19 ASSESSMENT — PAIN - FUNCTIONAL ASSESSMENT
PAIN_FUNCTIONAL_ASSESSMENT: PREVENTS OR INTERFERES SOME ACTIVE ACTIVITIES AND ADLS
PAIN_FUNCTIONAL_ASSESSMENT: PREVENTS OR INTERFERES SOME ACTIVE ACTIVITIES AND ADLS

## 2023-01-19 ASSESSMENT — PAIN DESCRIPTION - FREQUENCY
FREQUENCY: CONTINUOUS

## 2023-01-19 NOTE — DISCHARGE INSTRUCTIONS
Right Wrist Fracture ORIF Post-Op Instructions    Keep your splint clean/dry/intact. Do not remove it. Right hand/wrist non-weight bearing. Keep right hand/wrist elevated as much as possible above heart level to help with pain and swelling. Use a leak proof ice pack, and ice the surgical site frequently (alternate 15 minutes on, 15 minutes off). Make sure there is a clean dry barrier/towel between the ice pack and your skin to prevent frostbite injury. Encourage finger range of motion exercises every hour while awake - full extension and flexion () to prevent stiffness. Perform incentive spirometry every hour while awake, including at home. Wean off narcotic pain medications as soon as possible. Tylenol is okay, but try to avoid NSAIDs for 2 weeks after surgery (NSAIDs include ibuprofen, naproxen, diclofenac, meloxicam, etc.)  DVT prophylaxis - take aspirin 81mg by mouth twice a day for 2 weeks after surgery.     Cherylle Drilling

## 2023-01-19 NOTE — H&P
PLANNED PROCEDURE:  right distal radius ORIF, possible bridge plate    I have reviewed my notes and patient's imaging, examined the patient, and no pertinent changes are required.      Past Medical History:   Diagnosis Date    Arthritis     Reumitoid    COVID-19 12/2022    Diabetes mellitus ([facility]     Fracture of radius and ulna 01/2023    near right wrist    Hyperlipidemia     Hypertension     Kidney stones     Rheumatoid arthritis ([facility]        Past Surgical History:   Procedure Laterality Date    CORONARY ANGIOPLASTY WITH STENT PLACEMENT         Social History     Tobacco Use    Smoking status: Former     Packs/day: 0.50     Types: Cigarettes     Quit date: 2013     Years since quitting: 10.0    Smokeless tobacco: Never    Tobacco comments:     Quit smoking 2013   Vaping Use    Vaping Use: Never used   Substance Use Topics    Alcohol use: Never    Drug use: Never       No Known Allergies    Medications Prior to Admission: aspirin 81 MG EC tablet, Take 81 mg by mouth daily  methotrexate (RHEUMATREX) 2.5 MG chemo tablet, TAKE 8 TABLETS BY MOUTH ONCE A WEEK  acetaminophen (TYLENOL) 500 MG tablet, Take 1,000 mg by mouth every 6 hours as needed  amLODIPine (NORVASC) 5 MG tablet, Take 5 mg by mouth daily  ergocalciferol (ERGOCALCIFEROL) 1.25 MG (12914 UT) capsule, TAKE 1 CAPSULE BY MOUTH 1 TIME A WEEK (Patient not taking: Reported on 1/19/2023)  ferrous sulfate (IRON 325) 325 (65 Fe) MG tablet, Take 325 mg by mouth daily (with breakfast)  glipiZIDE (GLUCOTROL XL) 5 MG extended release tablet, Take 5 mg by mouth daily (with breakfast)  hydroxychloroquine (PLAQUENIL) 200 MG tablet, Take 200 mg by mouth 2 times daily  lisinopril (PRINIVIL;ZESTRIL) 30 MG tablet, Take 30 mg by mouth daily  metFORMIN (GLUCOPHAGE-XR) 500 MG extended release tablet, TAKE 2 TABLETS BY MOUTH TWICE DAILY  omeprazole (PRILOSEC) 20 MG delayed release capsule, Take 20 mg by mouth daily      Current Facility-Administered Medications:     ceFAZolin (ANCEF) 2,000 mg in dextrose 5 % 50 mL IVPB (mini-bag), 2,000 mg, IntraVENous, Once, Enrike No MD    sodium chloride flush 0.9 % injection 5-40 mL, 5-40 mL, IntraVENous, 2 times per day, Titus Russell MD    sodium chloride flush 0.9 % injection 5-40 mL, 5-40 mL, IntraVENous, PRN, Titus Russell MD    0.9 % sodium chloride infusion, , IntraVENous, PRN, Titus Russell MD    Vitals:    01/16/23 1109 01/19/23 0624   BP:  (!) 171/79   Pulse:  100   Resp:  20   Temp:  98.8 °F (37.1 °C)   TempSrc:  Oral   SpO2:  95%   Weight: 230 lb (104.3 kg) 233 lb 12.8 oz (106.1 kg)   Height: 5' 3\" (1.6 m) 5' 3\" (1.6 m)       Body mass index is 41.42 kg/m². Right short arm splint c/d/i  Wiggles all fingers up and down  Sensation intact to light touch all digits  CV RRR  Resp even, unlabored, on RA    An opportunity for questions was again given, and all questions were answered. The patient would like to proceed. \    Enrike No MD  1/19/2023

## 2023-01-19 NOTE — PROGRESS NOTES
Dr. Brown End at bedside to assess pt, states trying to call Dr. Madan Rodgers for permission to place nerve block.

## 2023-01-19 NOTE — PROGRESS NOTES
Pt arrived to pacu from OR asleep wakes to v/o. VSS on montior. O2 on 3L nc. Dressing RUE cdi elevate on pillow ice applied. Pt c/o of some pain states some nausea. Pt falls back to sleep.

## 2023-01-19 NOTE — BRIEF OP NOTE
Brief Postoperative Note      Patient: Marie Ricks  YOB: 1950  MRN: [id number]    Date of Procedure: 1/19/2023    Pre-Op Diagnosis: RIGHT DISTAL RADIUS FRACTURE, severely comminuted, intraarticular, depressed    Post-Op Diagnosis: Same       Procedure(s):  OPEN REDUCTION INTERNAL FIXATION RIGHT DISTAL RADIUS FRACTURE, POSSIBLE BRIDGE PLATE    Surgeon(s):  Jefferson Disla MD    First Assistant:  Celia HAWLEY. Anesthesia: General    Estimated Blood Loss (mL): less than 507UA    Complications: None immediately apparent    Specimens:   * No specimens in log *    Implants:  Implant Name Type Inv. Item Serial No.  Lot No. LRB No. Used Action   PLATE BNE STR 0.8W697 MM WRST SS STRL LCP - [id number]  PLATE BNE STR 5.6Q840 MM WRST SS STRL LCP  [facility]  Right 1 Implanted   SCREW BNE L10MM DIA2. 4MM HEATHER S STL ST T8 STARDRV RECESS - [id number]  SCREW BNE L10MM DIA2. 4MM HEATHER S STL ST T8 STARDRV RECESS  [employer]PU[employer]  Right 3 Implanted   SCREW BNE L12MM [id number] HEATHER S STL ST T8 STARDRV RECESS - [id number]  SCREW BNE L12MM [id number] HEATHER S STL ST T8 STARDRV RECESS  DEPU[employer]  Right 1 Implanted   SCREW BNE L12MM DIA2.7MM HEATHER S STL ST T8 STARDRV RECESS - [id number]  SCREW BNE L12MM DIA2.7MM HEATHER S STL ST T8 STARDRV RECESS  DEPUY [employer] system [place]-WD  Right 3 Implanted   SCREW BNE L14MM DIA2.4MM DST RAD VOLAR S STL ST MIGLE ANG ANNY - [id number]  SCREW BNE L14MM DIA2.4MM DST RAD VOLAR S STL ST MIGEL ANG ANNY  DEPUY [employer]  Right 1 Implanted   SCREW BNE L16MM DIA2.4MM DST RAD VOLAR S STL ST MIGEL ANG ANNY - [id number]  SCREW BNE L16MM DIA2.4MM DST RAD VOLAR S STL ST MIGEL ANG ANNY  DEPUY [employer]  Right 1 Implanted   PLATE BNE [id number] STD 9 H R DST RAD VOLAR S STL MIGEL ANG - [id number]  PLATE BNE [id number] STD 9 H R DST RAD VOLAR S STL MIGEL ANG  [employer] [place]  Right 1 Implanted   SCREW BNE L18MM DIA2.4MM DST RAD VOLAR S STL ST MIGEL ANG ANNY - [id number]  SCREW BNE L18MM DIA2.4MM DST RAD VOLAR S STL ST MIGEL ANG ANNY  DEPUY [employer]  Right 4 Implanted   SCREW BNE L12MM DIA2.4MM DST RAD VOLAR S STL ST MIGEL ANG ANNY - [id number]  SCREW BNE L12MM DIA2.4MM DST RAD VOLAR S STL ST MIGEL ANG ANNY  DEPU[employer]  Right 1 Implanted   SCREW BNE L20MM DIA2.4MM DST RAD VOLAR S STL ST MIGEL ANG ANNY - [id number]  SCREW BNE L20MM DIA2.4MM DST RAD VOLAR S STL ST MIGEL ANG ANNY  DEPUY [employer]  Right 1 Implanted   K WIRE FIX L150MM DIA1. 6MM [facility] - [id number]  K WIRE FIX L150MM DIA1. 6MM S [facility]  DEPUY [employer] [place]  Right 1 Implanted         Drains: * No LDAs found *    Findings:  required dorsal bridge plate, volar plating, and radial styloid pin    Electronically signed by Gosia Ceballos MD on 1/19/2023 at 9:37 AM

## 2023-01-19 NOTE — PROGRESS NOTES
Dr. Sixto Echeverria at bedside to assess pt after block administration. Pt sats remain low 90s pain 0/10. Dr. Sixto Echeverria ok with pt going to phase 2 for d/c.

## 2023-01-19 NOTE — ANESTHESIA PRE PROCEDURE
[facility] Department of Anesthesiology  Pre-Anesthesia Evaluation/Consultation       Name:  Matteo Brown  : 1950  Age:  71 y.o. MRN:  [id number]  Date: 2023           Surgeon: Surgeon(s):  Augustin Simon MD    Procedure: Procedure(s):  OPEN REDUCTION INTERNAL FIXATION RIGHT DISTAL RADIUS FRACTURE, POSSIBLE BRIDGE PLATE     No Known Allergies  There is no problem list on file for this patient. Past Medical History:   Diagnosis Date    Arthritis     Reumitoid    COVID-19 2022    Diabetes mellitus ([facility] [address] [facility]     Fracture of radius and ulna 2023    near right wrist    Hyperlipidemia     Hypertension     Kidney stones     Rheumatoid arthritis ([facility] [address]      Past Surgical History:   Procedure Laterality Date    CORONARY ANGIOPLASTY WITH STENT PLACEMENT       Social History     Tobacco Use    Smoking status: Former     Packs/day: 0.50     Types: Cigarettes     Quit date:      Years since quitting: 10.0    Smokeless tobacco: Never    Tobacco comments:     Quit smoking    Vaping Use    Vaping Use: Never used   Substance Use Topics    Alcohol use: Never    Drug use: Never     Medications  No current facility-administered medications on file prior to encounter.      Current Outpatient Medications on File Prior to Encounter   Medication Sig Dispense Refill    aspirin 81 MG EC tablet Take 81 mg by mouth daily      methotrexate (RHEUMATREX) 2.5 MG chemo tablet TAKE 8 TABLETS BY MOUTH ONCE A WEEK      acetaminophen (TYLENOL) 500 MG tablet Take 1,000 mg by mouth every 6 hours as needed      amLODIPine (NORVASC) 5 MG tablet Take 5 mg by mouth daily      ergocalciferol (ERGOCALCIFEROL) 1.25 MG (71349 UT) capsule TAKE 1 CAPSULE BY MOUTH 1 TIME A WEEK (Patient not taking: Reported on 2023)      ferrous sulfate (IRON 325) 325 (65 Fe) MG tablet Take 325 mg by mouth daily (with breakfast)      glipiZIDE (GLUCOTROL XL) 5 MG extended release tablet Take 5 mg by mouth daily (with breakfast)      hydroxychloroquine (PLAQUENIL) 200 MG tablet Take 200 mg by mouth 2 times daily      lisinopril (PRINIVIL;ZESTRIL) 30 MG tablet Take 30 mg by mouth daily      metFORMIN (GLUCOPHAGE-XR) 500 MG extended release tablet TAKE 2 TABLETS BY MOUTH TWICE DAILY      omeprazole (PRILOSEC) 20 MG delayed release capsule Take 20 mg by mouth daily       Current Facility-Administered Medications   Medication Dose Route Frequency Provider Last Rate Last Admin    ceFAZolin (ANCEF) 2,000 mg in dextrose 5 % 50 mL IVPB (mini-bag)  2,000 mg IntraVENous Once Philemon Felty, MD        sodium chloride flush 0.9 % injection 5-40 mL  5-40 mL IntraVENous 2 times per day Joann Babcock MD        sodium chloride flush 0.9 % injection 5-40 mL  5-40 mL IntraVENous PRN Joann Babcock MD        0.9 % sodium chloride infusion   IntraVENous PRN Joann Babcock MD         Vital Signs (Current)   Vitals:    23   BP: (!) 171/79   Pulse: 100   Resp: 20   Temp: 98.8 °F (37.1 °C)   SpO2: 95%     Vital Signs Statistics (for past 48 hrs)     Temp  Av.8 °F (37.1 °C)  Min: 98.8 °F (37.1 °C)   Min taken time: 23  Max: 98.8 °F (37.1 °C)   Max taken time: 23  Pulse  Av  Min: 100   Min taken time: 23  Max: 100   Max taken time: 23  Resp  Av  Min: 21   Min taken time: 23  Max: 20   Max taken time: 23  BP  Min: 171/79   Min taken time: 23  Max: 171/79   Max taken time: 23  SpO2  Av %  Min: 95 %   Min taken time: 23  Max: 95 %   Max taken time: 23    BP Readings from Last 3 Encounters:   23 (!) 171/79   23 (!) 167/80   22 (!) 158/88     BMI  Body mass index is 41.42 kg/m². Estimated body mass index is 41.42 kg/m² as calculated from the following:    Height as of this encounter: 5' 3\" (1.6 m).     Weight as of this encounter: 233 lb 12.8 oz (106.1 kg).    CBC No results found for: WBC, RBC, HGB, HCT, MCV, RDW, PLT  CMP    Lab Results   Component Value Date/Time     01/06/2021 10:04 AM    K 3.7 01/06/2021 10:04 AM    CL 99 01/06/2021 10:04 AM    CO2 26 01/06/2021 10:04 AM    BUN 16 01/06/2021 10:04 AM    CREATININE <0.5 01/06/2021 10:04 AM    GFRAA >60 01/06/2021 10:04 AM    LABGLOM >60 01/06/2021 10:04 AM    GLUCOSE 193 01/06/2021 10:04 AM    CALCIUM 9.2 01/06/2021 10:04 AM     BMP    Lab Results   Component Value Date/Time     01/06/2021 10:04 AM    K 3.7 01/06/2021 10:04 AM    CL 99 01/06/2021 10:04 AM    CO2 26 01/06/2021 10:04 AM    BUN 16 01/06/2021 10:04 AM    CREATININE <0.5 01/06/2021 10:04 AM    CALCIUM 9.2 01/06/2021 10:04 AM    GFRAA >60 01/06/2021 10:04 AM    LABGLOM >60 01/06/2021 10:04 AM    GLUCOSE 193 01/06/2021 10:04 AM     POCGlucose  No results for input(s): GLUCOSE in the last 72 hours.    Coags  No results found for: PROTIME, INR, APTT  HCG (If Applicable) No results found for: PREGTESTUR, PREGSERUM, HCG, HCGQUANT   ABGs No results found for: PHART, PO2ART, NMX6MKC, QCP4GMK, BEART, C7DFLMFN   Type & Screen (If Applicable)  No results found for: LABABO, LABRH                         BMI: Wt Readings from Last 3 Encounters:       NPO Status:   Date of last liquid consumption: 01/19/23   Time of last liquid consumption: 0500   Date of last solid food consumption: 01/18/23      Time of last solid consumption: 1900       Anesthesia Evaluation  Patient summary reviewed no history of anesthetic complications:   Airway: Mallampati: III  TM distance: >3 FB   Neck ROM: full  Mouth opening: > = 3 FB   Dental:    (+) partials      Pulmonary:Negative Pulmonary ROS and normal exam                               Cardiovascular:  Exercise tolerance: good (>4 METS),   (+) hypertension (EF 60):, CAD:, CABG/stent (stent 2013):, dysrhythmias (RBBB):, hyperlipidemia      ECG reviewed  Rhythm: regular  Rate: normal  Echocardiogram reviewed Beta Blocker:  Not on Beta Blocker         Neuro/Psych:   Negative Neuro/Psych ROS              GI/Hepatic/Renal:   (+) GERD: well controlled, morbid obesity          Endo/Other:    (+) DiabetesType II DM, , : arthritis: rheumatoid. , .    (-) blood dyscrasia               Abdominal:   (+) obese,           Vascular: negative vascular ROS. Other Findings:           Anesthesia Plan      general     ASA 3     (Patient having considerable pain 10/10 after multiple doses of narcotic. Sp02 requiring supplemental oxygen. Spoke with Dr. Jame Luque about rescue nerve block in which he agrees. Obtained oral consent from patient's sister, Angela Carrasco, who gives permission to perform nerve block. Patient also agreeable to nerve block. R/B/A of procedure described. Patient and sister understand and would like to proceed. Questions and concerns obtained.)  Induction: intravenous. MIPS: Postoperative opioids intended and Prophylactic antiemetics administered. Anesthetic plan and risks discussed with patient. Plan discussed with CRNA. This pre-anesthesia assessment may be used as a history and physical.    DOS STAFF ADDENDUM:    Pt seen and examined, chart reviewed (including anesthesia, drug and allergy history). No interval changes to history and physical examination. Anesthetic plan, risks, benefits, alternatives, and personnel involved discussed with patient. Questions and concerns addressed. Patient(family) verbalized an understanding and agrees to proceed.       Honey Laird MD  January 19, 2023  6:46 AM

## 2023-01-19 NOTE — PROGRESS NOTES
Pt c/o uncontrolled pain after 2 doses IV pain meds. Unable to give more narcotics d/t pt's O2 sat. Call placed to Dr. Felix Menon, states will come to bedside.

## 2023-01-19 NOTE — ANESTHESIA POSTPROCEDURE EVALUATION
Department of Anesthesiology  Postprocedure Note    Patient: Wai Antony  MRN: [id number]  YOB: 1950  Date of evaluation: 1/19/2023      Procedure Summary     Date: 01/19/23 Room / Location: [address]    Anesthesia Start: 1744 Anesthesia Stop: 1198    Procedure: OPEN REDUCTION INTERNAL FIXATION RIGHT DISTAL RADIUS FRACTURE, POSSIBLE BRIDGE PLATE (Right: Wrist) Diagnosis:       Closed fracture of distal end of right radius, unspecified fracture morphology, initial encounter      (RIGHT DISTAL RADIUS FRACTURE)    Surgeons: Nuvia Hatfield MD Responsible Provider: Morena Ravi MD    Anesthesia Type: general ASA Status: 3          Anesthesia Type: No value filed.     Vahid Phase I: Vahid Score: 10    Vahid Phase II: Vahid Score: 10      Anesthesia Post Evaluation    Patient location during evaluation: PACU  Patient participation: complete - patient participated  Level of consciousness: awake and alert  Pain score: 0  Nausea & Vomiting: no nausea  Complications: no  Cardiovascular status: hemodynamically stable  Respiratory status: acceptable  Hydration status: stable

## 2023-01-19 NOTE — ANESTHESIA PROCEDURE NOTES
Peripheral Block    Patient location during procedure: PACU  Reason for block: post-op pain management and at surgeon's request  Start time: 1/19/2023 11:08 AM  End time: 1/19/2023 11:13 AM  Staffing  Performed: anesthesiologist   Anesthesiologist: Gary Choudhary MD  Preanesthetic Checklist  Completed: patient identified, IV checked, site marked, risks and benefits discussed, surgical/procedural consents, equipment checked, pre-op evaluation, timeout performed, anesthesia consent given, oxygen available, monitors applied/VS acknowledged, fire risk safety assessment completed and verbalized and blood product R/B/A discussed and consented  Peripheral Block   Patient position: sitting  Prep: DuraPrep  Provider prep: sterile gloves and mask  Patient monitoring: continuous capnometry, frequent blood pressure checks, continuous pulse ox, cardiac monitor, IV access, oxygen and responsive to questions  Block type: Axillary  R axillary  Laterality: right  Injection technique: single-shot  Guidance: ultrasound guided    Needle   Needle type: insulated echogenic nerve stimulator needle   Needle gauge: 20 G  Needle localization: ultrasound guidance  Needle insertion depth: 4 cm  Needle length: 8 cm  Assessment   Injection assessment: negative aspiration for heme, no paresthesia on injection, local visualized surrounding nerve on ultrasound and no intravascular symptoms  Paresthesia pain: none  Slow fractionated injection: yes  Hemodynamics: stable  Real-time US image taken/store: yes  Outcomes: uncomplicated and patient tolerated procedure well    Additional Notes  Rescue block in recovery due to uncontrolled pain 10/10. Dr. Bobie Schwab consulted who agrees with nerve block. Spoke with patient and sister, Natalee Hi, about R/B/A of nerve block after which both understood and desired to proceed. Nerve block performed in recovery room without issue.   Medications Administered  ropivacaine (NAROPIN) injection 0.5% - Perineural   25 mL - 1/19/2023 11:08:00 AM

## 2023-01-19 NOTE — PROGRESS NOTES
Pt states she is starting to have more pain. Medicated with PO pain medication per MAR. Vital signs stable on room air. Discharge instructions given and explained to Pt and sister, all questions answered.

## 2023-01-19 NOTE — PROGRESS NOTES
Pt to Phase 2 from PACU, Pt voided and is now up to chair.  States pain is tolerable at this time, vital signs stable on room air

## 2023-01-20 NOTE — OP NOTE
[address] [place]                                 OPERATIVE REPORT    PATIENT NAME: Eleonora Tripathi                     :        1950  MED REC NO:   [id number]                          ROOM:  ACCOUNT NO:   [de-identified]                           ADMIT DATE: 2023  PROVIDER:     Juanjo Peters MD    DATE OF PROCEDURE:  2023    PREOPERATIVE DIAGNOSIS:  Closed right distal radius fracture, severely  comminuted, intraarticular, with significant displacement and  angulation. POSTOPERATIVE DIAGNOSIS:  Closed right distal radius fracture, severely  comminuted, intraarticular, with significant displacement and  angulation. OPERATION PERFORMED:  Open treatment of the right intraarticular distal  radius fracture with internal fixation of greater than three  intraarticular fragments. SURGEON:  Juanjo Peters MD    FIRST ASSISTANT:  Carl HAWLEY. ANESTHESIA:  General.    BLOOD LOSS:  Less than 100 mL. COMPLICATIONS:  None immediately apparent. IMPLANTS:   1. Synthes wrist dorsal bridge plate. 2.  Synthes right distal radius variable angle locking plate. OPERATIVE PROCEDURE:  The patient was brought back to the OR and  transferred onto the operating room table. General anesthesia was  administered. A nonsterile axillary tourniquet was applied and the  entire right upper extremity was subsequently prepped and draped in the  usual sterile fashion. A full time-out was performed with all parties  in agreement. The patient did receive cefazolin for preoperative IV  antibiotics. I first attempted closed reduction with longitudinal traction, volar  translation, ulnar deviation and use the mini C-arm to check our  reduction.   Even with closed reduction maneuvers, there was still  significant articular incongruity and depression of the fracture  fragments as well as a separate large radial styloid piece. The right  upper extremity was exsanguinated with an Esmarch and the tourniquet  was then inflated. An extended FCR approach to the distal radius was  utilized. The pronator quadratus was elevated in L-shaped fashion over  the fracture site and retracted ulnarly. The volar cortex was  significantly comminuted and there were multiple intraarticular  fragments, greater than 3. The fracture site was cleared of soft tissue  and irrigated out. The brachioradialis insertion to the radial styloid  was released with a 15 blade to remove the deforming force. I then used  a [place] elevator to reduce the ulnar fragments and a dental pick to  reduce the radial styloid piece and held the wrist reduced with wrist  flexion, ulnar deviation, and radial translation of the shaft fragment  using a blunt Hohmann retractor. A small incision was then made over  the radial styloid and bluntly spread onto the bone using a hemostat. A  K-wire was then inserted through the radial styloid down to the  metaphyseal region to provide fixation. The mini C-arm was again used  to check our reduction at this point and did reveal acceptable radial  height and inclination. However, the depressed articular fragments were  still seen and the wrist still appeared to be in ulnar positive  position. Therefore, the decision was made at this point to proceed  with a formal bridge plate fixation as well. The forearm was pronated  and the bridge plate was placed dorsally to get an idea of our skin  incisions over the second metacarpal and radial shaft. The skin  incision over the second metacarpal was first made and careful  dissection was performed onto the second metacarpal shaft. The extensor  tendons were retracted to expose this adequately. Next, the proximal  incision over the radial shaft was made and dissection was carried down  through the interval between the second compartment tendons and the  brachioradialis.   The fascia was incised and the ECRL/ECRB tendons were  retracted ulnarly to expose the radial shaft.  I then used a [place] and  then an elevator to develop the tract for the dorsal bridge plate  working from proximal to distal as well as distal to proximal.  The  dorsal bridge plate was then passed deep to the extensor tendons from   distal to proximal.  It was secured to the second metacarpal shaft and   the radial shaft using standard reduction clamps.  Adjustments were made  to the position of the plate on the second metacarpal as well as over the  radial shaft while applying longitudinal traction.  These were checked  on fluoroscopy and once I was satisfied with the position and reduction,   screw holes were sequentially filled. Combination of cortical and locking  screws were inserted in both the second metacarpal and radial shaft to  provide fixation of the dorsal bridge plate.      Once the bridge plate was secured, the forearm was again supinated and  attention was paid to the distal radius from the volar side.  I used a  [place] elevator and worked through the volar comminution to elevate the  mid articular fracture fragments and elevated them appropriately.  This  worked well and the articular congruity was checked on fluoroscopic  images.  Next, I decided to place a volar locking plate to support the   joint line and subchondral bone, while reducing the volar cortex.  The   6-hole head variable angle distal radius locking plate was selected and  provisionally secured.  The position was checked on fluoroscopy.  I then  secured the volar plate to the shaft using one cortical screw and one  locking screw.  Five variable angle locking screws were then inserted  over the multiple intraarticular fracture fragments, greater than 3.   The radial styloid K-wire was providing good fixation of this large   radial styloid fragment.  Therefore, I elected to leave this in place   for supplemental fixation of the fracture as  well.    Final fluoroscopic images were obtained and saved.  The radial styloid  K-wire was bent and cut to the appropriate length and a cap was placed.   Dorsal and volar incisions were thoroughly irrigated and infiltrated  with plain Marcaine.  The tourniquet was let down and hemostasis was  obtained with pressure.  The incisions were then closed with inverted  Vicryls and 4-0 nylon suture.  They were cleaned with wet and dries and  then dressed with antibiotic ointment, Xeroform, sterile gauze, and  sterile Webril.  The drapes were removed and a well-padded volar splint  was applied.  The patient was extubated and transferred to the PACU in  stable condition.    Given the severely comminuted and depressed fracture fragments, the entire procedure took almost 100% longer.  Extra time was spent obtaining adequate exposure, getting reduction, and dual plating (volar and dorsal).     Nuria Kaur's assistance was necessary for positioning, retraction and exposure, manipulation of the operative limb, placement/insertion of hardware, wound closure, and other assistant duties that were necessary to complete the procedure.    CAR RICHMOND MD    D: 01/19/2023 11:49:06       T: 01/19/2023 14:09:07     [id number]  Job#: [id number]     Doc#: [id number]    CC:

## 2023-02-02 ENCOUNTER — OFFICE VISIT (OUTPATIENT)
Dept: ORTHOPEDIC SURGERY | Age: 73
End: 2023-02-02

## 2023-02-02 VITALS — RESPIRATION RATE: 16 BRPM | WEIGHT: 233 LBS | BODY MASS INDEX: 41.29 KG/M2 | HEIGHT: 63 IN

## 2023-02-02 DIAGNOSIS — S62.347A CLOSED NONDISPLACED FRACTURE OF BASE OF FIFTH METACARPAL BONE OF LEFT HAND, INITIAL ENCOUNTER: ICD-10-CM

## 2023-02-02 DIAGNOSIS — S52.611A TRAUMATIC CLOSED FRACTURE OF ULNAR STYLOID WITH MINIMAL DISPLACEMENT, RIGHT, INITIAL ENCOUNTER: ICD-10-CM

## 2023-02-02 DIAGNOSIS — S52.501A TRAUMATIC CLOSED DISPLACED FRACTURE OF DISTAL END OF RADIUS, RIGHT, INITIAL ENCOUNTER: Primary | ICD-10-CM

## 2023-02-02 RX ORDER — OXYCODONE HYDROCHLORIDE AND ACETAMINOPHEN 5; 325 MG/1; MG/1
1 TABLET ORAL EVERY 8 HOURS PRN
Qty: 21 TABLET | Refills: 0 | Status: CANCELLED | OUTPATIENT
Start: 2023-02-02 | End: 2023-02-09

## 2023-02-02 RX ORDER — OXYCODONE HYDROCHLORIDE AND ACETAMINOPHEN 5; 325 MG/1; MG/1
1 TABLET ORAL EVERY 8 HOURS PRN
Qty: 21 TABLET | Refills: 0 | Status: SHIPPED | OUTPATIENT
Start: 2023-02-02 | End: 2023-02-09

## 2023-02-02 NOTE — PROGRESS NOTES
Post Operative Visit    Surgery: Open treatment of right intra-articular distal radius fracture with internal fixation of greater then 3 intra-articular fragments  Date of Surgery: 1/20/23    Subjective: Xiomara Tejeda returns 2 weeks after the above procedure by Dr. Andres Evans. The patient is doing okay overall. Complains of moderate pain in the right wrist, but well controlled with pain meds. Confirms NWB in splint at all times. Complains of thumb stiffness and tingling in all digits except for little finger. Also, follow-up for left fifth metacarpal fracture. Admits that she has not been wearing her left ulnar gutter brace because she needs that hand to perform ADLs since she cannot use her right hand at all. She states that the pain in her left hand is no worse than the chronic pain she has had due to her arthritis. Mainly just notices it when twisting. No other complaints or concerns. Objective:   Right wrist -- Incisions are clean, dry, and intact after sutures removed. Radial pin present, in appropriate position. No SOI. There is moderate swelling of the wrist and hand. Wrist ROM deferred. Able to actively move all digits, but unable to make a composite fist due to swelling. SILT M/U/R/A/LABC nerve distributions. AIN/PIN/IO intact. CR <2 seconds. Left hand -skin is intact, clean, and dry. No obvious deformity. No swelling or ecchymosis. Tenderness to palpation over fifth metacarpal.  Full ROM of wrist and digits. SILT M/U/R/A/LABC nerve distributions. AIN/PIN/IO intact. CR < 2sec. Imaging: Xrays were reviewed and interpreted by me. Right wrist 3 views performed 02/02/23  in clinic - status post open reduction internal fixation of the distal radius fracture with volar locking plate, dorsal bridge plate, and radial percutaneous pin. Reduction is maintained. Acceptable radial height, inclination, and mild volar tilt is seen.   Ulnar styloid fracture unchanged. Assessment:   Tawana Mast was seen today for post-op check. Diagnoses and all orders for this visit:    Traumatic closed displaced fracture of distal end of radius, right, initial encounter  -     XR WRIST RIGHT (MIN 3 VIEWS); Future    Traumatic closed fracture of ulnar styloid with minimal displacement, right, initial encounter  -     XR WRIST RIGHT (MIN 3 VIEWS); Future    Closed nondisplaced fracture of base of fifth metacarpal bone of left hand, initial encounter  -     XR WRIST RIGHT (MIN 3 VIEWS); Future       Plan:  Xrays were reviewed. Sutures were removed today in clinic. Steri-Strips applied. Percutaneous pin will be removed at 6 weeks. Patient is aware that the bridge plate will need to be removed at approximately 3 months out from surgery. Transition to right short arm cast today. Cast instructions were given. NWB RUE. Light weightbearing to LUE. No driving for total of 2 months. Okay to take NSAIDs for pain and/or swelling. Okay to take acetaminophen if NOT taking narcotics. Refill of Percocet given. We will continue to monitor tingling in the hand. Likely just from swelling. Follow-up in 4 weeks for repeat x-rays of right wrist and left hand.       Abhishek Richards PA-C  02/02/23  10:48 AM        SY:  Lieutenant Serra checked  Percocet #21 sent

## 2023-02-17 ENCOUNTER — TELEPHONE (OUTPATIENT)
Dept: ORTHOPEDIC SURGERY | Age: 73
End: 2023-02-17

## 2023-02-17 DIAGNOSIS — S52.501A TRAUMATIC CLOSED DISPLACED FRACTURE OF DISTAL END OF RADIUS, RIGHT, INITIAL ENCOUNTER: Primary | ICD-10-CM

## 2023-02-17 RX ORDER — OXYCODONE HYDROCHLORIDE AND ACETAMINOPHEN 5; 325 MG/1; MG/1
1 TABLET ORAL EVERY 12 HOURS PRN
Qty: 14 TABLET | Refills: 0 | Status: SHIPPED | OUTPATIENT
Start: 2023-02-17 | End: 2023-02-24

## 2023-02-17 NOTE — TELEPHONE ENCOUNTER
02/15/2023  02/14/2023   1  Hydrocodone-Acetamin 5-325 Mg 12.00  3  Ch Tur  [id number]   Wal ([id number])  0  20.00 MME  [place]     02/02/2023 02/02/2023   1  Oxycodone-Acetaminophen 5-325 21.00  7  Sh Troy  [id number]   Wal ([id number])  0  22.50 MME  [place]     01/19/2023 01/19/2023   1  Oxycodone-Acetaminophen 5-325 21.00  7  Sh Troy  [id number]   Juan ([id number])  0  22.50 MME  [place]  [place]     01/14/2023 01/14/2023   1  Oxycodone-Acetaminophen 5-325 16.00  4  Ke Eitan  [id number]   Wal ([id number])  0  30.00 MME  Medicare [place]     09/14/2022 09/14/2022   1  Oxycodone-Acetaminophen 5-325 12.00  7  Ma Mar  [id number]   Wal ([id number])  0  12.86 MME  Medicare [place]     10/14/2021  10/14/2021   1  Oxycodone-Acetaminophen 5-325 12.00  2  Ma Mar  [id number]   Wal ([id number])  0  45.00 MME  [place]        Needs to wean off narcotics ASAP  Percocet #14 sent

## 2023-02-17 NOTE — TELEPHONE ENCOUNTER
Prescription Refill     Medication Name:  oxycodone  Pharmacy: [facility]  Patient Contact Number:  [phone]

## 2023-02-24 ENCOUNTER — TELEPHONE (OUTPATIENT)
Dept: ORTHOPEDIC SURGERY | Age: 73
End: 2023-02-24

## 2023-02-24 DIAGNOSIS — S52.501A TRAUMATIC CLOSED DISPLACED FRACTURE OF DISTAL END OF RADIUS, RIGHT, INITIAL ENCOUNTER: Primary | ICD-10-CM

## 2023-02-24 NOTE — TELEPHONE ENCOUNTER
Prescription Refill     Medication Name:  OXYCODONE  Pharmacy: [facility] [id number] [address] [facility], [address] [place] [address]  Patient Contact Number:  [phone]

## 2023-02-27 ENCOUNTER — TELEPHONE (OUTPATIENT)
Dept: ORTHOPEDIC SURGERY | Age: 73
End: 2023-02-27

## 2023-02-27 DIAGNOSIS — S52.501A TRAUMATIC CLOSED DISPLACED FRACTURE OF DISTAL END OF RADIUS, RIGHT, INITIAL ENCOUNTER: Primary | ICD-10-CM

## 2023-02-27 RX ORDER — OXYCODONE HYDROCHLORIDE AND ACETAMINOPHEN 5; 325 MG/1; MG/1
1 TABLET ORAL DAILY
Qty: 7 TABLET | Refills: 0 | Status: SHIPPED | OUTPATIENT
Start: 2023-02-27 | End: 2023-02-27 | Stop reason: RX

## 2023-02-27 RX ORDER — OXYCODONE HYDROCHLORIDE AND ACETAMINOPHEN 5; 325 MG/1; MG/1
1 TABLET ORAL DAILY
Qty: 7 TABLET | Refills: 0 | Status: SHIPPED | OUTPATIENT
Start: 2023-02-27 | End: 2023-03-06

## 2023-02-27 NOTE — TELEPHONE ENCOUNTER
Prescription Refill     Medication Name:  OXYCODONE   Pharmacy: [phone]    Patient Contact Number:  [phone]      PATIENT WOULD LIKE TO TRANSFER PHARMACY. ..

## 2023-02-27 NOTE — TELEPHONE ENCOUNTER
Percocet sent to pharmacy. She may only take 1 per day, and this will be her last refill as we do not give refills past 6 weeks postop. She will need to switch to OTC anti-inflammatories for pain.

## 2023-03-02 ENCOUNTER — OFFICE VISIT (OUTPATIENT)
Dept: ORTHOPEDIC SURGERY | Age: 73
End: 2023-03-02

## 2023-03-02 VITALS — HEIGHT: 63 IN | BODY MASS INDEX: 41.29 KG/M2 | WEIGHT: 233 LBS | RESPIRATION RATE: 16 BRPM

## 2023-03-02 DIAGNOSIS — S52.571D OTHER CLOSED INTRA-ARTICULAR FRACTURE OF DISTAL END OF RIGHT RADIUS WITH ROUTINE HEALING, SUBSEQUENT ENCOUNTER: Primary | ICD-10-CM

## 2023-03-02 DIAGNOSIS — S62.347D CLOSED NONDISPLACED FRACTURE OF BASE OF FIFTH METACARPAL BONE OF LEFT HAND WITH ROUTINE HEALING, SUBSEQUENT ENCOUNTER: ICD-10-CM

## 2023-03-02 RX ORDER — TRAMADOL HYDROCHLORIDE 50 MG/1
50 TABLET ORAL EVERY 12 HOURS PRN
Qty: 14 TABLET | Refills: 0 | Status: SHIPPED | OUTPATIENT
Start: 2023-03-06 | End: 2023-03-13

## 2023-03-02 NOTE — PROGRESS NOTES
ORTHOPAEDIC PROGRESS NOTE    Chief Complaint   Patient presents with    Post-Op Check     Right distal radius ORIF 1/9/23  Left hand       HPI  3/2/23  Second postop check right distal radius ORIF, including with bridge plate  Also follow-up of left fifth metacarpal base fracture treated nonoperatively  She denies issues with the left hand  Does have some intermittent aches over the ulnar aspect of the left hand  She describes stiffness in her right hand/fingers  Improving swelling, but persists  She does describe some diffuse numbness and tingling in all 5 digits      1/19/23  OPERATION PERFORMED:  Open treatment of the right intraarticular distal  radius fracture with internal fixation of greater than three intraarticular fragments.         Past Medical History:   Diagnosis Date    Arthritis     Reumitoid    COVID-19 12/2022    Diabetes mellitus ([facility] [address]     Fracture of radius and ulna 01/2023    near right wrist    Hyperlipidemia     Hypertension     Kidney stones     Rheumatoid arthritis ([facility] [address]        Past Surgical History:   Procedure Laterality Date    CORONARY ANGIOPLASTY WITH STENT PLACEMENT      FOREARM SURGERY Right 1/19/2023    OPEN REDUCTION INTERNAL FIXATION RIGHT DISTAL RADIUS FRACTURE performed by Josselin Toney MD at [place] [address]       No Known Allergies    Current Outpatient Medications   Medication Instructions    acetaminophen (TYLENOL) 1,000 mg, Oral, EVERY 6 HOURS PRN    amLODIPine (NORVASC) 5 mg, Oral, DAILY    aspirin 81 mg, Oral, DAILY    ergocalciferol (ERGOCALCIFEROL) 1.25 MG (89801 UT) capsule TAKE 1 CAPSULE BY MOUTH 1 TIME A WEEK    ferrous sulfate (IRON 325) 325 mg, Oral, DAILY WITH BREAKFAST    glipiZIDE (GLUCOTROL XL) 5 mg, Oral, DAILY WITH BREAKFAST    hydroxychloroquine (PLAQUENIL) 200 mg, Oral, 2 TIMES DAILY    lisinopril (PRINIVIL;ZESTRIL) 30 mg, Oral, DAILY    metFORMIN (GLUCOPHAGE-XR) 500 MG extended release tablet TAKE 2 TABLETS BY MOUTH TWICE DAILY    methotrexate (RHEUMATREX) 2.5 MG chemo tablet TAKE 8 TABLETS BY MOUTH ONCE A WEEK    omeprazole (PRILOSEC) 20 mg, Oral, DAILY    oxyCODONE-acetaminophen (PERCOCET) 5-325 MG per tablet 1 tablet, Oral, DAILY    [START ON 3/6/2023] traMADol (ULTRAM) 50 mg, Oral, EVERY 12 HOURS PRN, Intended supply: 7 days. Take lowest dose possible to manage pain       Vitals:    03/02/23 0956   Resp: 16   Weight: 233 lb (105.7 kg)   Height: 5' 3\" (1.6 m)       Physical Exam:  Body mass index is 41.27 kg/m². Right hand/wrist -    Xeroderma   No skin breakdown   Radial styloid pin site c/d/i   Removed with pliers in clinic today, then steristrips applied   She is able to actively extend and flex all 5 digits, however stiffness does persist, particular with flexion   Sensation is grossly intact to light touch all 5 digits    Left hand -   Mild tenderness to palpation of the fifth metacarpal base  Full digital range of motion    BUE SILT M/U/R/A/LABC nerve distributions; AIN/PIN/IO intact        Imaging:  Images were personally reviewed by myself and discussed with the patient  Narrative   EXAMINATION:   THREE XRAY VIEWS OF THE LEFT HAND       1/14/2023 5:14 am       COMPARISON:   Right hand radiographs performed 01/14/2023. HISTORY:   ORDERING SYSTEM PROVIDED HISTORY: fall, injury   TECHNOLOGIST PROVIDED HISTORY:   Reason for exam:->fall, injury   Reason for Exam: fall, left hand pain       FINDINGS:   There is a fracture at the base of the 5th metacarpal.  There is diffuse   degenerative joint disease. The surrounding soft tissues are unremarkable. Impression   Fracture at the base of the 5th metacarpal.         Left hand 3 views performed 3/2/23 -   The fifth metacarpal base fracture is again seen. It is minimally displaced/impacted, alignment is grossly unchanged compared to injury radiographs from January 14, 2023. No other fractures identified. Interval fracture healing is seen. Baseline diffuse degenerative changes.         Narrative EXAMINATION:   3 XRAY VIEWS OF THE RIGHT WRIST       1/14/2023 3:31 am       COMPARISON:   None. HISTORY:   ORDERING SYSTEM PROVIDED HISTORY: trauma suspect fx   TECHNOLOGIST PROVIDED HISTORY:   Reason for exam:->trauma suspect fx   Reason for Exam: fall       FINDINGS:   There is a comminuted and impacted fracture of the distal radial metaphysis. There is an ulnar styloid fracture. The joint spaces are maintained. There   is soft tissue swelling. Impression   Comminuted impacted fracture of the distal radial metaphysis. Ulnar styloid fracture. Right wrist 3 views performed 3/2/23 - status post open reduction internal fixation of the comminuted intra-articular distal radius fracture with dorsal bridge plate and volar locking plate. Radial styloid pin is present as well. Overall reduction is maintained compared to prior postoperative and intraoperative images. Ulnar styloid fracture again seen. Assessment & Plan:  71 y.o. female following up for   Diagnosis Orders   1. Other closed intra-articular fracture of distal end of right radius with routine healing, subsequent encounter  XR WRIST RIGHT (MIN 3 VIEWS)    External Referral To Physical Therapy    traMADol (ULTRAM) 50 MG tablet    Tabatha Leal Titan Wrist Long Forearm Brace      2. Closed nondisplaced fracture of base of fifth metacarpal bone of left hand with routine healing, subsequent encounter  XR HAND LEFT (MIN 3 VIEWS)    traMADol (ULTRAM) 50 MG tablet              Procedures    Tabatha Leal Titan Wrist Long Forearm Brace     Patient was prescribed a Tabatha Leal Titan Wrist and Forearm Brace. The right wrist will require stabilization / immobilization from this semi-rigid / rigid orthosis to improve their function. The orthosis will assist in protecting the affected area, provide functional support and facilitate healing.     The patient was educated and fit by a healthcare professional with expert knowledge and specialization in brace application while under the direct supervision of the treating physician. Verbal and written instructions for the use of and application of this item were provided. They were instructed to contact the office immediately should the brace result in increased pain, decreased sensation, increased swelling or worsening of the condition.        Right wrist -   Radial styloid pin removed today, Steri-Strips applied  Recommend formal hand therapy at this time for digital range of motion exercises, modalities, desensitization  Diffuse right hand numbness and tingling, grossly neurovascular intact, all flexor and extensor tendons are intact  Continue to monitor, likely related to her severe injury and swelling associated with tthat  Should continue to improve  Follow-up in 6 weeks with repeat right wrist radiographs  Depending on appearance of those radiographs, we will schedule for dorsal bridge plate removal at that appointment as well  Long forearm wrist brace for support/comfort  She is down to 1 Percocet a day  She reports tylenol alone not helping  Recommend weaning off of narcotics ASAP  OARRS checked, tramadol twice daily refilled with fill date for Monday, March 6  Ice, elevation    Left hand -   Continue closed treatment  Exam good  Activities as tolerated    Padilla Zarate MD

## 2023-03-15 ENCOUNTER — TELEPHONE (OUTPATIENT)
Dept: ORTHOPEDIC SURGERY | Age: 73
End: 2023-03-15

## 2023-03-15 NOTE — TELEPHONE ENCOUNTER
POST OP     Subject: PO PROBLEM RT WRIST/S/ SX 1/19  Patient Request: Afua Rinaldi  Contact Number: [phone]    PATIENT IS REQ TO SPEAK TO SOMEONE REGARDING HER RT WRIST CAST. IT RUBBING AGAINST KNUCKLES AND THUMB CAUSING PAIN. IT'S BEGINNING TO BREAK THE SKIN. SHE HAS TRIED GAUZE BUT STILL RUBBING AREA. PLEASE RETURN CALL TO PATIENT AT THE ABOVE NUMBER.     WILL REACH OUT TO CLINIC

## 2023-03-15 NOTE — TELEPHONE ENCOUNTER
Called and lm/vm to call back and make an appt for tomorrow at the [facility] office

## 2023-03-16 ENCOUNTER — OFFICE VISIT (OUTPATIENT)
Dept: ORTHOPEDIC SURGERY | Age: 73
End: 2023-03-16

## 2023-03-16 VITALS — WEIGHT: 233 LBS | RESPIRATION RATE: 16 BRPM | HEIGHT: 63 IN | BODY MASS INDEX: 41.29 KG/M2

## 2023-03-16 DIAGNOSIS — S52.571D OTHER CLOSED INTRA-ARTICULAR FRACTURE OF DISTAL END OF RIGHT RADIUS WITH ROUTINE HEALING, SUBSEQUENT ENCOUNTER: Primary | ICD-10-CM

## 2023-03-16 PROCEDURE — 99024 POSTOP FOLLOW-UP VISIT: CPT | Performed by: ORTHOPAEDIC SURGERY

## 2023-03-16 NOTE — PROGRESS NOTES
ORTHOPAEDIC PROGRESS NOTE    Chief Complaint   Patient presents with    Post-Op Check     Right wrist          HPI  3/16/23  Lucero Tian returns to clinic today early for questions in regards to her right wrist  She reports the wrist brace rubs against the dorsal hand, causes irritation  She wants to make sure there is no skin issues or breakdown  She has not gotten the right hand/wrist/forearm region wet in the shower yet  She is working on finger range of motion herself, she did not start hand therapy yet as ordered at her last appointment  Denies N/T today      3/2/23  Second postop check right distal radius ORIF, including with bridge plate  Also follow-up of left fifth metacarpal base fracture treated nonoperatively  She denies issues with the left hand  Does have some intermittent aches over the ulnar aspect of the left hand  She describes stiffness in her right hand/fingers  Improving swelling, but persists  She does describe some diffuse numbness and tingling in all 5 digits      1/19/23  OPERATION PERFORMED:  Open treatment of the right intraarticular distal  radius fracture with internal fixation of greater than three intraarticular fragments.         Past Medical History:   Diagnosis Date    Arthritis     Reumitoid    COVID-19 12/2022    Diabetes mellitus ([facility] [address]     Fracture of radius and ulna 01/2023    near right wrist    Hyperlipidemia     Hypertension     Kidney stones     Rheumatoid arthritis ([facility] [address]        Past Surgical History:   Procedure Laterality Date    CORONARY ANGIOPLASTY WITH STENT PLACEMENT      FOREARM SURGERY Right 1/19/2023    OPEN REDUCTION INTERNAL FIXATION RIGHT DISTAL RADIUS FRACTURE performed by Etienne Harris MD at [place] [address]       No Known Allergies    Current Outpatient Medications   Medication Instructions    acetaminophen (TYLENOL) 1,000 mg, Oral, EVERY 6 HOURS PRN    amLODIPine (NORVASC) 5 mg, Oral, DAILY    aspirin 81 mg, Oral, DAILY    ergocalciferol (ERGOCALCIFEROL) 1.25 MG (42542 UT) capsule TAKE 1 CAPSULE BY MOUTH 1 TIME A WEEK    ferrous sulfate (IRON 325) 325 mg, Oral, DAILY WITH BREAKFAST    glipiZIDE (GLUCOTROL XL) 5 mg, Oral, DAILY WITH BREAKFAST    hydroxychloroquine (PLAQUENIL) 200 mg, Oral, 2 TIMES DAILY    lisinopril (PRINIVIL;ZESTRIL) 30 mg, Oral, DAILY    metFORMIN (GLUCOPHAGE-XR) 500 MG extended release tablet TAKE 2 TABLETS BY MOUTH TWICE DAILY    methotrexate (RHEUMATREX) 2.5 MG chemo tablet TAKE 8 TABLETS BY MOUTH ONCE A WEEK    omeprazole (PRILOSEC) 20 mg, Oral, DAILY       Vitals:    03/16/23 0833   Resp: 16   Weight: 233 lb (105.7 kg)   Height: 5' 3\" (1.6 m)         Physical Exam:  Body mass index is 41.27 kg/m². Right hand/wrist -    Xeroderma present   No skin breakdown   No ulceration   No open wounds   Radial styloid pin site c/d/i, covered with Steri-Strip   Other surgical incisions are well-healed, no erythema or drainage   Appropriate amount of postoperative swelling seen today   She is able to actively extend and flex all 5 digits, however stiffness does persist, particular with flexion   Sensation is intact to light touch all 5 digits  BUE SILT M/U/R/A/LABC nerve distributions; AIN/PIN/IO intact        Imaging:  Prior images were reviewed today  Narrative   EXAMINATION:   THREE XRAY VIEWS OF THE LEFT HAND       1/14/2023 5:14 am       COMPARISON:   Right hand radiographs performed 01/14/2023. HISTORY:   ORDERING SYSTEM PROVIDED HISTORY: fall, injury   TECHNOLOGIST PROVIDED HISTORY:   Reason for exam:->fall, injury   Reason for Exam: fall, left hand pain       FINDINGS:   There is a fracture at the base of the 5th metacarpal.  There is diffuse   degenerative joint disease. The surrounding soft tissues are unremarkable. Impression   Fracture at the base of the 5th metacarpal.         Left hand 3 views performed 3/2/23 -   The fifth metacarpal base fracture is again seen.   It is minimally displaced/impacted, alignment is grossly unchanged compared to injury radiographs from January 14, 2023. No other fractures identified. Interval fracture healing is seen. Baseline diffuse degenerative changes. Narrative   EXAMINATION:   3 XRAY VIEWS OF THE RIGHT WRIST       1/14/2023 3:31 am       COMPARISON:   None. HISTORY:   ORDERING SYSTEM PROVIDED HISTORY: trauma suspect fx   TECHNOLOGIST PROVIDED HISTORY:   Reason for exam:->trauma suspect fx   Reason for Exam: fall       FINDINGS:   There is a comminuted and impacted fracture of the distal radial metaphysis. There is an ulnar styloid fracture. The joint spaces are maintained. There   is soft tissue swelling. Impression   Comminuted impacted fracture of the distal radial metaphysis. Ulnar styloid fracture. Right wrist 3 views performed 3/2/23 - status post open reduction internal fixation of the comminuted intra-articular distal radius fracture with dorsal bridge plate and volar locking plate. Radial styloid pin is present as well. Overall reduction is maintained compared to prior postoperative and intraoperative images. Ulnar styloid fracture again seen. Assessment & Plan:  71 y.o. female following up for   Diagnosis Orders   1. Other closed intra-articular fracture of distal end of right radius with routine healing, subsequent encounter              No orders of the defined types were placed in this encounter.       Right wrist -   There is no skin breakdown  There is no wound complications  She can get her right upper extremity wet in the shower  Recommend she get into formal hand therapy ASAP for digital range of motion exercises, modalities, desensitization  Ice therapy, heat therapy as needed  Make sure there is a barrier between the pad and the skin to prevent skin injury  Follow-up in 1 month with repeat right wrist radiographs  Depending on appearance of those radiographs, we will schedule for dorsal bridge plate removal at that appointment as well  She can minimize use of the long forearm wrist brace as she reports it causes more irritation      Toni Amado MD

## 2023-04-17 ENCOUNTER — TELEPHONE (OUTPATIENT)
Dept: ORTHOPEDIC SURGERY | Age: 73
End: 2023-04-17

## 2023-04-17 NOTE — TELEPHONE ENCOUNTER
Spoke with patient about surgery on 4/24/2023 -- surgery info emailed to patient's sister per patient

## 2023-04-17 NOTE — TELEPHONE ENCOUNTER
Surgery and/or Procedure Scheduling     Contact Name: Minh Piper  Surgical/Procedure Request: RIGHT HAND   Patient Contact Number:  [phone]    PATIENT CALLING REGARDING HER SURGERY SCHEDULED ON Monday April 24,2023. PATIENT WOULD LIKE TO SPEAK WITH SOMEONE IN DOCTOR [facility]'S OFFICE REGARDING THE SURGERY.     PLEASE CALL BACK AT THE ABOVE NUMBER

## 2023-04-18 ENCOUNTER — TELEPHONE (OUTPATIENT)
Dept: ORTHOPEDIC SURGERY | Age: 73
End: 2023-04-18

## 2023-04-18 NOTE — TELEPHONE ENCOUNTER
General Question     Subject: PATIENT IS WISHING TO SPEAK TO SOMEONE REGARDING PAPERWORK FROM GP BEFORE [address]     PLEASE ADVISE    Patient: Nimesh Weeks  Contact Number: [phone]

## 2023-04-24 ENCOUNTER — ANESTHESIA EVENT (OUTPATIENT)
Dept: OPERATING ROOM | Age: 73
End: 2023-04-24
Payer: MEDICARE

## 2023-04-24 ENCOUNTER — APPOINTMENT (OUTPATIENT)
Dept: GENERAL RADIOLOGY | Age: 73
End: 2023-04-24
Attending: ORTHOPAEDIC SURGERY
Payer: MEDICARE

## 2023-04-24 ENCOUNTER — HOSPITAL ENCOUNTER (OUTPATIENT)
Age: 73
Setting detail: OUTPATIENT SURGERY
Discharge: HOME | End: 2023-04-24
Attending: ORTHOPAEDIC SURGERY | Admitting: ORTHOPAEDIC SURGERY
Payer: MEDICARE

## 2023-04-24 ENCOUNTER — ANESTHESIA (OUTPATIENT)
Dept: OPERATING ROOM | Age: 73
End: 2023-04-24
Payer: MEDICARE

## 2023-04-24 VITALS
SYSTOLIC BLOOD PRESSURE: 156 MMHG | TEMPERATURE: 97.2 F | HEIGHT: 63 IN | HEART RATE: 76 BPM | RESPIRATION RATE: 13 BRPM | OXYGEN SATURATION: 97 % | DIASTOLIC BLOOD PRESSURE: 76 MMHG | BODY MASS INDEX: 38.8 KG/M2 | WEIGHT: 219 LBS

## 2023-04-24 DIAGNOSIS — S52.571D OTHER CLOSED INTRA-ARTICULAR FRACTURE OF DISTAL END OF RIGHT RADIUS WITH ROUTINE HEALING, SUBSEQUENT ENCOUNTER: Primary | ICD-10-CM

## 2023-04-24 LAB
GLUCOSE BLD-MCNC: 101 MG/DL (ref 70–99)
GLUCOSE BLD-MCNC: 143 MG/DL (ref 70–99)
PERFORMED ON: ABNORMAL
PERFORMED ON: ABNORMAL

## 2023-04-24 PROCEDURE — 3600000014 HC SURGERY LEVEL 4 ADDTL 15MIN: Performed by: ORTHOPAEDIC SURGERY

## 2023-04-24 PROCEDURE — 3700000001 HC ADD 15 MINUTES (ANESTHESIA): Performed by: ORTHOPAEDIC SURGERY

## 2023-04-24 PROCEDURE — 7100000000 HC PACU RECOVERY - FIRST 15 MIN: Performed by: ORTHOPAEDIC SURGERY

## 2023-04-24 PROCEDURE — 3600000004 HC SURGERY LEVEL 4 BASE: Performed by: ORTHOPAEDIC SURGERY

## 2023-04-24 PROCEDURE — 6360000002 HC RX W HCPCS: Performed by: ANESTHESIOLOGY

## 2023-04-24 PROCEDURE — A4217 STERILE WATER/SALINE, 500 ML: HCPCS | Performed by: ORTHOPAEDIC SURGERY

## 2023-04-24 PROCEDURE — 2709999900 HC NON-CHARGEABLE SUPPLY: Performed by: ORTHOPAEDIC SURGERY

## 2023-04-24 PROCEDURE — 7100000001 HC PACU RECOVERY - ADDTL 15 MIN: Performed by: ORTHOPAEDIC SURGERY

## 2023-04-24 PROCEDURE — 6360000002 HC RX W HCPCS: Performed by: ORTHOPAEDIC SURGERY

## 2023-04-24 PROCEDURE — 2580000003 HC RX 258: Performed by: ANESTHESIOLOGY

## 2023-04-24 PROCEDURE — 3700000000 HC ANESTHESIA ATTENDED CARE: Performed by: ORTHOPAEDIC SURGERY

## 2023-04-24 PROCEDURE — 7100000011 HC PHASE II RECOVERY - ADDTL 15 MIN: Performed by: ORTHOPAEDIC SURGERY

## 2023-04-24 PROCEDURE — 2500000003 HC RX 250 WO HCPCS: Performed by: ORTHOPAEDIC SURGERY

## 2023-04-24 PROCEDURE — 2500000003 HC RX 250 WO HCPCS: Performed by: REGISTERED NURSE

## 2023-04-24 PROCEDURE — 6370000000 HC RX 637 (ALT 250 FOR IP): Performed by: ANESTHESIOLOGY

## 2023-04-24 PROCEDURE — 2580000003 HC RX 258: Performed by: ORTHOPAEDIC SURGERY

## 2023-04-24 PROCEDURE — 6360000002 HC RX W HCPCS: Performed by: REGISTERED NURSE

## 2023-04-24 PROCEDURE — 7100000010 HC PHASE II RECOVERY - FIRST 15 MIN: Performed by: ORTHOPAEDIC SURGERY

## 2023-04-24 PROCEDURE — 20680 REMOVAL OF IMPLANT DEEP: CPT | Performed by: ORTHOPAEDIC SURGERY

## 2023-04-24 PROCEDURE — 2580000003 HC RX 258: Performed by: REGISTERED NURSE

## 2023-04-24 RX ORDER — ONDANSETRON 2 MG/ML
INJECTION INTRAMUSCULAR; INTRAVENOUS PRN
Status: DISCONTINUED | OUTPATIENT
Start: 2023-04-24 | End: 2023-04-24 | Stop reason: SDUPTHER

## 2023-04-24 RX ORDER — LABETALOL HYDROCHLORIDE 5 MG/ML
10 INJECTION, SOLUTION INTRAVENOUS
Status: DISCONTINUED | OUTPATIENT
Start: 2023-04-24 | End: 2023-04-24 | Stop reason: HOSPADM

## 2023-04-24 RX ORDER — HYDRALAZINE HYDROCHLORIDE 20 MG/ML
10 INJECTION INTRAMUSCULAR; INTRAVENOUS
Status: DISCONTINUED | OUTPATIENT
Start: 2023-04-24 | End: 2023-04-24 | Stop reason: HOSPADM

## 2023-04-24 RX ORDER — PROPOFOL 10 MG/ML
INJECTION, EMULSION INTRAVENOUS PRN
Status: DISCONTINUED | OUTPATIENT
Start: 2023-04-24 | End: 2023-04-24 | Stop reason: SDUPTHER

## 2023-04-24 RX ORDER — MAGNESIUM HYDROXIDE 1200 MG/15ML
LIQUID ORAL CONTINUOUS PRN
Status: COMPLETED | OUTPATIENT
Start: 2023-04-24 | End: 2023-04-24

## 2023-04-24 RX ORDER — SODIUM CHLORIDE, SODIUM LACTATE, POTASSIUM CHLORIDE, CALCIUM CHLORIDE 600; 310; 30; 20 MG/100ML; MG/100ML; MG/100ML; MG/100ML
INJECTION, SOLUTION INTRAVENOUS CONTINUOUS
Status: DISCONTINUED | OUTPATIENT
Start: 2023-04-24 | End: 2023-04-24 | Stop reason: HOSPADM

## 2023-04-24 RX ORDER — BUPIVACAINE HYDROCHLORIDE 2.5 MG/ML
INJECTION, SOLUTION EPIDURAL; INFILTRATION; INTRACAUDAL
Status: COMPLETED | OUTPATIENT
Start: 2023-04-24 | End: 2023-04-24

## 2023-04-24 RX ORDER — DEXAMETHASONE SODIUM PHOSPHATE 4 MG/ML
INJECTION, SOLUTION INTRA-ARTICULAR; INTRALESIONAL; INTRAMUSCULAR; INTRAVENOUS; SOFT TISSUE PRN
Status: DISCONTINUED | OUTPATIENT
Start: 2023-04-24 | End: 2023-04-24 | Stop reason: SDUPTHER

## 2023-04-24 RX ORDER — ONDANSETRON 2 MG/ML
4 INJECTION INTRAMUSCULAR; INTRAVENOUS
Status: DISCONTINUED | OUTPATIENT
Start: 2023-04-24 | End: 2023-04-24 | Stop reason: HOSPADM

## 2023-04-24 RX ORDER — HYDROMORPHONE HCL 110MG/55ML
0.5 PATIENT CONTROLLED ANALGESIA SYRINGE INTRAVENOUS EVERY 5 MIN PRN
Status: DISCONTINUED | OUTPATIENT
Start: 2023-04-24 | End: 2023-04-24 | Stop reason: HOSPADM

## 2023-04-24 RX ORDER — LIDOCAINE HYDROCHLORIDE 20 MG/ML
INJECTION, SOLUTION EPIDURAL; INFILTRATION; INTRACAUDAL; PERINEURAL PRN
Status: DISCONTINUED | OUTPATIENT
Start: 2023-04-24 | End: 2023-04-24 | Stop reason: SDUPTHER

## 2023-04-24 RX ORDER — SODIUM CHLORIDE, SODIUM LACTATE, POTASSIUM CHLORIDE, CALCIUM CHLORIDE 600; 310; 30; 20 MG/100ML; MG/100ML; MG/100ML; MG/100ML
INJECTION, SOLUTION INTRAVENOUS CONTINUOUS PRN
Status: DISCONTINUED | OUTPATIENT
Start: 2023-04-24 | End: 2023-04-24 | Stop reason: SDUPTHER

## 2023-04-24 RX ORDER — OXYCODONE HYDROCHLORIDE AND ACETAMINOPHEN 5; 325 MG/1; MG/1
1 TABLET ORAL EVERY 8 HOURS PRN
Qty: 15 TABLET | Refills: 0 | Status: SHIPPED | OUTPATIENT
Start: 2023-04-24 | End: 2023-04-29

## 2023-04-24 RX ORDER — KETAMINE HCL IN NACL, ISO-OSM 100MG/10ML
SYRINGE (ML) INJECTION PRN
Status: DISCONTINUED | OUTPATIENT
Start: 2023-04-24 | End: 2023-04-24 | Stop reason: SDUPTHER

## 2023-04-24 RX ORDER — FENTANYL CITRATE 50 UG/ML
INJECTION, SOLUTION INTRAMUSCULAR; INTRAVENOUS PRN
Status: DISCONTINUED | OUTPATIENT
Start: 2023-04-24 | End: 2023-04-24 | Stop reason: SDUPTHER

## 2023-04-24 RX ORDER — FOLIC ACID 1 MG/1
1 TABLET ORAL DAILY
COMMUNITY

## 2023-04-24 RX ORDER — LIDOCAINE HYDROCHLORIDE 10 MG/ML
1 INJECTION, SOLUTION EPIDURAL; INFILTRATION; INTRACAUDAL; PERINEURAL
Status: DISCONTINUED | OUTPATIENT
Start: 2023-04-24 | End: 2023-04-24 | Stop reason: HOSPADM

## 2023-04-24 RX ORDER — OXYCODONE HYDROCHLORIDE 5 MG/1
5 TABLET ORAL
Status: COMPLETED | OUTPATIENT
Start: 2023-04-24 | End: 2023-04-24

## 2023-04-24 RX ADMIN — HYDROMORPHONE HYDROCHLORIDE 0.5 MG: 2 INJECTION, SOLUTION INTRAMUSCULAR; INTRAVENOUS; SUBCUTANEOUS at 13:09

## 2023-04-24 RX ADMIN — LIDOCAINE HYDROCHLORIDE 100 MG: 20 INJECTION, SOLUTION EPIDURAL; INFILTRATION; INTRACAUDAL; PERINEURAL at 12:03

## 2023-04-24 RX ADMIN — CEFAZOLIN 2000 MG: 2 INJECTION, POWDER, FOR SOLUTION INTRAMUSCULAR; INTRAVENOUS at 11:57

## 2023-04-24 RX ADMIN — Medication 20 MG: at 12:32

## 2023-04-24 RX ADMIN — ONDANSETRON 4 MG: 2 INJECTION INTRAMUSCULAR; INTRAVENOUS at 12:09

## 2023-04-24 RX ADMIN — SODIUM CHLORIDE, POTASSIUM CHLORIDE, SODIUM LACTATE AND CALCIUM CHLORIDE: 600; 310; 30; 20 INJECTION, SOLUTION INTRAVENOUS at 11:01

## 2023-04-24 RX ADMIN — FENTANYL CITRATE 50 MCG: 50 INJECTION, SOLUTION INTRAMUSCULAR; INTRAVENOUS at 12:03

## 2023-04-24 RX ADMIN — OXYCODONE 5 MG: 5 TABLET ORAL at 13:30

## 2023-04-24 RX ADMIN — PROPOFOL 200 MG: 10 INJECTION, EMULSION INTRAVENOUS at 12:03

## 2023-04-24 RX ADMIN — Medication 30 MG: at 12:17

## 2023-04-24 RX ADMIN — DEXAMETHASONE SODIUM PHOSPHATE 4 MG: 4 INJECTION, SOLUTION INTRAMUSCULAR; INTRAVENOUS at 12:09

## 2023-04-24 RX ADMIN — HYDROMORPHONE HYDROCHLORIDE 0.5 MG: 2 INJECTION, SOLUTION INTRAMUSCULAR; INTRAVENOUS; SUBCUTANEOUS at 13:14

## 2023-04-24 RX ADMIN — SODIUM CHLORIDE, POTASSIUM CHLORIDE, SODIUM LACTATE AND CALCIUM CHLORIDE: 600; 310; 30; 20 INJECTION, SOLUTION INTRAVENOUS at 11:59

## 2023-04-24 RX ADMIN — FENTANYL CITRATE 50 MCG: 50 INJECTION, SOLUTION INTRAMUSCULAR; INTRAVENOUS at 12:15

## 2023-04-24 ASSESSMENT — PAIN DESCRIPTION - PAIN TYPE
TYPE: SURGICAL PAIN

## 2023-04-24 ASSESSMENT — PAIN DESCRIPTION - ORIENTATION
ORIENTATION: RIGHT

## 2023-04-24 ASSESSMENT — PAIN DESCRIPTION - FREQUENCY
FREQUENCY: CONTINUOUS

## 2023-04-24 ASSESSMENT — PAIN DESCRIPTION - DESCRIPTORS
DESCRIPTORS: SORE

## 2023-04-24 ASSESSMENT — PAIN SCALES - GENERAL
PAINLEVEL_OUTOF10: 5
PAINLEVEL_OUTOF10: 7
PAINLEVEL_OUTOF10: 5
PAINLEVEL_OUTOF10: 5
PAINLEVEL_OUTOF10: 7

## 2023-04-24 ASSESSMENT — PAIN DESCRIPTION - LOCATION
LOCATION: HAND

## 2023-04-24 ASSESSMENT — LIFESTYLE VARIABLES: SMOKING_STATUS: 0

## 2023-04-24 ASSESSMENT — PAIN - FUNCTIONAL ASSESSMENT: PAIN_FUNCTIONAL_ASSESSMENT: 0-10

## 2023-04-24 NOTE — PROGRESS NOTES
Discharge instructions review with patient and sister Magdaleno Suero. All home medications have been reviewed, pt v/u. Discharge instructions signed. Pt discharged via wheelchair. Pt discharged with 1 RX and all belongings. Sister taking stable pt home.

## 2023-04-24 NOTE — ANESTHESIA PRE PROCEDURE
Department of Anesthesiology  Preprocedure Note       Name:  Ollie Culp   Age:  71 y.o.  :  1950                                          MRN:  [id number]         Date:  2023      Surgeon: Diane Blackwood):  Akhil Thrasher MD    Procedure: Procedure(s):  REMOVAL OF HARDWARE-RIGHT HAND/WRIST/FOREARM-SYNTHES    Medications prior to admission:   Prior to Admission medications    Medication Sig Start Date End Date Taking? Authorizing Provider   methotrexate (RHEUMATREX) 2.5 MG chemo tablet TAKE 8 TABLETS BY MOUTH ONCE A WEEK 22   Historical Provider, MD   aspirin 81 MG EC tablet Take 81 mg by mouth daily    Historical Provider, MD   acetaminophen (TYLENOL) 500 MG tablet Take 1,000 mg by mouth every 6 hours as needed    Historical Provider, MD   amLODIPine (NORVASC) 5 MG tablet Take 5 mg by mouth daily 21   Historical Provider, MD   ergocalciferol (ERGOCALCIFEROL) 1.25 MG (16225 UT) capsule TAKE 1 CAPSULE BY MOUTH 1 TIME A WEEK  Patient not taking: Reported on 2023   Historical Provider, MD   ferrous sulfate (IRON 325) 325 (65 Fe) MG tablet Take 325 mg by mouth daily (with breakfast) 21   Historical Provider, MD   glipiZIDE (GLUCOTROL XL) 5 MG extended release tablet Take 5 mg by mouth daily (with breakfast) 21   Historical Provider, MD   hydroxychloroquine (PLAQUENIL) 200 MG tablet Take 200 mg by mouth 2 times daily 10/5/21   Historical Provider, MD   lisinopril (PRINIVIL;ZESTRIL) 30 MG tablet Take 30 mg by mouth daily 21   Historical Provider, MD   metFORMIN (GLUCOPHAGE-XR) 500 MG extended release tablet TAKE 2 TABLETS BY MOUTH TWICE DAILY 21   Historical Provider, MD   omeprazole (PRILOSEC) 20 MG delayed release capsule Take 20 mg by mouth daily 21   Historical Provider, MD       Current medications:    No current facility-administered medications for this encounter.        Allergies:  No Known Allergies    Problem List:    Patient Active Problem List

## 2023-04-24 NOTE — ANESTHESIA POSTPROCEDURE EVALUATION
Department of Anesthesiology  Postprocedure Note    Patient: Edwin Moncada  MRN: [id number]  YOB: 1950  Date of evaluation: 4/24/2023      Procedure Summary     Date: 04/24/23 Room / Location: [address]    Anesthesia Start: 4347 Anesthesia Stop: 1215    Procedure: REMOVAL OF HARDWARE-RIGHT HAND/WRIST/FOREARM-SYNTHES (Right: Arm Lower) Diagnosis:       Other closed intra-articular fracture of distal end of right radius with routine healing, subsequent encounter      (S52.578C HISTORY OF RIGHT WRIST ORIF WITH BRIDGE PLATE)    Surgeons: Nolan Fletcher MD Responsible Provider: Bianka Queen MD    Anesthesia Type: general ASA Status: 3          Anesthesia Type: No value filed.     Vahid Phase I: Vahid Score: 4    Vahid Phase II:        Anesthesia Post Evaluation    Patient location during evaluation: PACU  Patient participation: complete - patient participated  Level of consciousness: awake and alert  Airway patency: patent  Nausea & Vomiting: no nausea and no vomiting  Complications: no  Cardiovascular status: hemodynamically stable  Respiratory status: acceptable  Hydration status: stable  Multimodal analgesia pain management approach

## 2023-04-24 NOTE — DISCHARGE INSTRUCTIONS
Hardware Removal Post-Op Instructions    Keep your dressing on and clean/dry/intact, replace only if soiled or saturated. Surgical site should be covered for 7 days after surgery, after which it can be left open to air if there is no drainage. Keep right hand/wrist elevated as much as possible above heart level to help with pain and swelling. Use a leak proof ice pack, and ice the surgical site frequently (alternate 15 minutes on, 15 minutes off). Make sure there is a clean dry barrier/towel between the ice pack and your skin to prevent frostbite injury. Encourage finger range of motion exercises every hour while awake - full extension and flexion () to prevent stiffness. Ok to return to occupational/hand therapy after surgery to resume exercises. Wean off narcotic pain medications as soon as possible. Tylenol is okay, but try to avoid NSAIDs for 2 weeks after surgery (NSAIDs include ibuprofen, naproxen, diclofenac, meloxicam, etc.)    Milena Ruizer DISCHARGE INSTRUCTIONS    Follow your surgeons instructions. Make follow-up appointment. Observe operative area for signs of excessive bleeding such as a slow general ooze that saturates the dressing or bright red bleeding. In either case, apply pressure to the area and elevate if possible and call your surgeon right away. Observe the affected extremity for circulation or nerve impairment such as a change in color, numbness, tingling, coldness or increased pain. If any of these symptoms are present call your surgeon. Observe operative site for any signs of infection such as increased pain, redness, fever greater than 101 degrees, swelling, foul odor or drainage. Contact surgeon if any of these symptoms are present. If you become short of breath call your surgeon or go to the nearest emergency room. Remove dressing if directed by surgeon. Leave steristips or sutures or staples in place.   You may loosen your ace wrap if it

## 2023-04-24 NOTE — H&P
PLANNED PROCEDURE:  removal of hardware from the right hand/wrist/forearm, indicated procedures    I have reviewed my notes and patient's imaging, examined the patient, and no pertinent changes are required.      Past Medical History:   Diagnosis Date    Arthritis     Reumitoid    COVID-19 12/2022    Diabetes mellitus ([facility] [address]     Fracture of radius and ulna 01/2023    near right wrist    Hyperlipidemia     Hypertension     Kidney stones     Rheumatoid arthritis ([facility] [address]        Past Surgical History:   Procedure Laterality Date    CORONARY ANGIOPLASTY WITH STENT PLACEMENT      FOREARM SURGERY Right 1/19/2023    OPEN REDUCTION INTERNAL FIXATION RIGHT DISTAL RADIUS FRACTURE performed by Akhil Thrasher MD at [address] [place] [address] [address] History     Tobacco Use    Smoking status: Former     Packs/day: 0.50     Types: Cigarettes     Quit date: 2013     Years since quitting: 10.3    Smokeless tobacco: Never    Tobacco comments:     Quit smoking 2013   Vaping Use    Vaping Use: Never used   Substance Use Topics    Alcohol use: Never    Drug use: Never       No Known Allergies    Medications Prior to Admission: folic acid (FOLVITE) 1 MG tablet, Take 1 tablet by mouth daily  methotrexate (RHEUMATREX) 2.5 MG chemo tablet, TAKE 8 TABLETS BY MOUTH ONCE A WEEK  aspirin 81 MG EC tablet, Take 1 tablet by mouth daily  acetaminophen (TYLENOL) 500 MG tablet, Take 2 tablets by mouth every 6 hours as needed  amLODIPine (NORVASC) 5 MG tablet, Take 1 tablet by mouth daily  ergocalciferol (ERGOCALCIFEROL) 1.25 MG (06702 UT) capsule,   ferrous sulfate (IRON 325) 325 (65 Fe) MG tablet, Take 325 mg by mouth daily (with breakfast)  glipiZIDE (GLUCOTROL XL) 5 MG extended release tablet, Take 1 tablet by mouth daily (with breakfast)  hydroxychloroquine (PLAQUENIL) 200 MG tablet, Take 1 tablet by mouth 2 times daily  lisinopril (PRINIVIL;ZESTRIL) 30 MG tablet, Take 1 tablet by mouth daily  metFORMIN (GLUCOPHAGE-XR) 500 MG extended release tablet, TAKE 2 TABLETS

## 2023-04-24 NOTE — PROGRESS NOTES
Pt arrived from OR to PACU bay 7. Report received from OR staff. Pt not arousable to voice. Surgical incisions dressings in place to Right hand. Pt on 6L simple mask with OPA in place, and VSS. Will continue to monitor.

## 2023-04-25 NOTE — OP NOTE
proximally as well as another four screws from distally. The plate was then liberated from the surrounding soft tissues and  retrieved out distally. The wrist was then manipulated to breakup some  scar tissue. Fluoroscopic images were obtained, which showed a healed  distal radius fracture with acceptable radial height, inclination, and  tilt. The volar locking plate remained in good position. The two wounds were thoroughly irrigated with normal saline and   infiltrated with local anesthetic. They were then closed with inverted   Vicryls and 3-0 nylon sutures. Tourniquet was let down and hemostasis   was obtained with direct pressure. It was dressed in the usual sterile   fashion. The drapes were removed and an Ace wrap was applied. The patient was extubated, transferred back onto the hospital stretcher,  and then moved to the PACU.       Susan Hester MD    D: 04/24/2023 14:19:56       T: 04/24/2023 21:05:09     [email or web address]  Job#: [id number]     Doc#: [id number]    CC:

## 2023-04-26 ENCOUNTER — TELEPHONE (OUTPATIENT)
Dept: ORTHOPEDIC SURGERY | Age: 73
End: 2023-04-26

## 2023-04-26 ENCOUNTER — HOSPITAL ENCOUNTER (OUTPATIENT)
Dept: GENERAL RADIOLOGY | Age: 73
Discharge: HOME | End: 2023-04-26
Payer: MEDICARE

## 2023-04-26 DIAGNOSIS — S62.101D CLOSED FRACTURE OF RIGHT WRIST WITH ROUTINE HEALING, SUBSEQUENT ENCOUNTER: ICD-10-CM

## 2023-04-26 PROCEDURE — 73100 X-RAY EXAM OF WRIST: CPT

## 2023-04-26 NOTE — TELEPHONE ENCOUNTER
Called and told her to loosen the wrap and ice and elevate and try and move her fingers around.   Told her to leave the bandage on until she is seen, she will call back if these options dont help

## 2023-04-26 NOTE — TELEPHONE ENCOUNTER
Other: patient call and she had sx on 04/24/23 on hr rt hand removal of hardware she is having some pain in that hand and she stated her fingers are stiff and she think the bandage is on to tight she is also having some pain she would just like to know if that is normal after surgery. She need to also know when can she take off the bandage? Please Advise.

## 2023-05-02 ENCOUNTER — TELEPHONE (OUTPATIENT)
Dept: ORTHOPEDIC SURGERY | Age: 73
End: 2023-05-02

## 2023-05-02 NOTE — TELEPHONE ENCOUNTER
Called and spoke to patient -- incision in hand in hurting --- she is going to undress and see what incision looks like -- if any concern she will need to be seen this week -- otherwise she can redress and loosen bandage --patient verbalized understanding

## 2023-05-02 NOTE — TELEPHONE ENCOUNTER
General Question     Subject: WOUND CARE   Patient and /or Facility Request: Veronica Mirza  Contact Number: [phone]       PATIENT ASKING IF SHE CAN CHANGE BANDAGES, *FEELING SKIN PAIN-UNDERNEATH BANDAGES*. NOT HAVING HAND THERAPY UNTIL 05.05.23.       PLEASE ADVISE

## 2023-05-05 ENCOUNTER — APPOINTMENT (OUTPATIENT)
Dept: GENERAL RADIOLOGY | Age: 73
DRG: 493 | End: 2023-05-05
Payer: MEDICARE

## 2023-05-05 ENCOUNTER — HOSPITAL ENCOUNTER (INPATIENT)
Age: 73
LOS: 4 days | Discharge: NURSING, REHAB OR LONG-TERM CARE | DRG: 493 | End: 2023-05-09
Attending: HOSPITALIST | Admitting: HOSPITALIST
Payer: MEDICARE

## 2023-05-05 DIAGNOSIS — S82.891A CLOSED FRACTURE OF RIGHT ANKLE, INITIAL ENCOUNTER: Primary | ICD-10-CM

## 2023-05-05 PROBLEM — S82.899A: Status: ACTIVE | Noted: 2023-05-05

## 2023-05-05 LAB
ANION GAP SERPL CALCULATED.3IONS-SCNC: 11 MMOL/L (ref 3–16)
BASOPHILS # BLD: 0.1 K/UL (ref 0–0.2)
BASOPHILS NFR BLD: 0.6 %
BUN SERPL-MCNC: 18 MG/DL (ref 7–20)
CALCIUM SERPL-MCNC: 8.4 MG/DL (ref 8.3–10.6)
CHLORIDE SERPL-SCNC: 103 MMOL/L (ref 99–110)
CO2 SERPL-SCNC: 26 MMOL/L (ref 21–32)
CREAT SERPL-MCNC: 0.6 MG/DL (ref 0.6–1.2)
DEPRECATED RDW RBC AUTO: 16.9 % (ref 12.4–15.4)
EOSINOPHIL # BLD: 0.1 K/UL (ref 0–0.6)
EOSINOPHIL NFR BLD: 0.5 %
GFR SERPLBLD CREATININE-BSD FMLA CKD-EPI: >60 ML/MIN/{1.73_M2}
GLUCOSE BLD-MCNC: 171 MG/DL (ref 70–99)
GLUCOSE SERPL-MCNC: 111 MG/DL (ref 70–99)
HCT VFR BLD AUTO: 32.4 % (ref 36–48)
HGB BLD-MCNC: 11 G/DL (ref 12–16)
INR PPP: 0.95 (ref 0.84–1.16)
LYMPHOCYTES # BLD: 0.6 K/UL (ref 1–5.1)
LYMPHOCYTES NFR BLD: 6.2 %
MCH RBC QN AUTO: 29.7 PG (ref 26–34)
MCHC RBC AUTO-ENTMCNC: 33.8 G/DL (ref 31–36)
MCV RBC AUTO: 87.7 FL (ref 80–100)
MONOCYTES # BLD: 0.7 K/UL (ref 0–1.3)
MONOCYTES NFR BLD: 7.5 %
NEUTROPHILS # BLD: 8.3 K/UL (ref 1.7–7.7)
NEUTROPHILS NFR BLD: 85.2 %
PERFORMED ON: ABNORMAL
PLATELET # BLD AUTO: 112 K/UL (ref 135–450)
PMV BLD AUTO: 10 FL (ref 5–10.5)
POTASSIUM SERPL-SCNC: 4.1 MMOL/L (ref 3.5–5.1)
PROTHROMBIN TIME: 12.7 SEC (ref 11.5–14.8)
RBC # BLD AUTO: 3.7 M/UL (ref 4–5.2)
SODIUM SERPL-SCNC: 140 MMOL/L (ref 136–145)
WBC # BLD AUTO: 9.7 K/UL (ref 4–11)

## 2023-05-05 PROCEDURE — 93005 ELECTROCARDIOGRAM TRACING: CPT | Performed by: PHYSICIAN ASSISTANT

## 2023-05-05 PROCEDURE — 85610 PROTHROMBIN TIME: CPT

## 2023-05-05 PROCEDURE — 2060000000 HC ICU INTERMEDIATE R&B

## 2023-05-05 PROCEDURE — 29515 APPLICATION SHORT LEG SPLINT: CPT

## 2023-05-05 PROCEDURE — 85025 COMPLETE CBC W/AUTO DIFF WBC: CPT

## 2023-05-05 PROCEDURE — 36415 COLL VENOUS BLD VENIPUNCTURE: CPT

## 2023-05-05 PROCEDURE — 6370000000 HC RX 637 (ALT 250 FOR IP): Performed by: PHYSICIAN ASSISTANT

## 2023-05-05 PROCEDURE — 73610 X-RAY EXAM OF ANKLE: CPT

## 2023-05-05 PROCEDURE — 80048 BASIC METABOLIC PNL TOTAL CA: CPT

## 2023-05-05 PROCEDURE — 99285 EMERGENCY DEPT VISIT HI MDM: CPT

## 2023-05-05 RX ORDER — ACETAMINOPHEN 325 MG/1
650 TABLET ORAL EVERY 6 HOURS PRN
Status: DISCONTINUED | OUTPATIENT
Start: 2023-05-05 | End: 2023-05-10 | Stop reason: HOSPADM

## 2023-05-05 RX ORDER — POLYETHYLENE GLYCOL 3350 17 G/17G
17 POWDER, FOR SOLUTION ORAL DAILY PRN
Status: DISCONTINUED | OUTPATIENT
Start: 2023-05-05 | End: 2023-05-10 | Stop reason: HOSPADM

## 2023-05-05 RX ORDER — ACETAMINOPHEN 650 MG/1
650 SUPPOSITORY RECTAL EVERY 6 HOURS PRN
Status: DISCONTINUED | OUTPATIENT
Start: 2023-05-05 | End: 2023-05-10 | Stop reason: HOSPADM

## 2023-05-05 RX ORDER — ENOXAPARIN SODIUM 100 MG/ML
30 INJECTION SUBCUTANEOUS 2 TIMES DAILY
Status: DISCONTINUED | OUTPATIENT
Start: 2023-05-06 | End: 2023-05-08

## 2023-05-05 RX ORDER — OXYCODONE HYDROCHLORIDE 5 MG/1
5 TABLET ORAL ONCE
Status: COMPLETED | OUTPATIENT
Start: 2023-05-05 | End: 2023-05-05

## 2023-05-05 RX ORDER — ONDANSETRON 2 MG/ML
4 INJECTION INTRAMUSCULAR; INTRAVENOUS EVERY 6 HOURS PRN
Status: DISCONTINUED | OUTPATIENT
Start: 2023-05-05 | End: 2023-05-10 | Stop reason: HOSPADM

## 2023-05-05 RX ORDER — ACETAMINOPHEN 500 MG
1000 TABLET ORAL ONCE
Status: COMPLETED | OUTPATIENT
Start: 2023-05-05 | End: 2023-05-05

## 2023-05-05 RX ORDER — SODIUM CHLORIDE 0.9 % (FLUSH) 0.9 %
5-40 SYRINGE (ML) INJECTION PRN
Status: DISCONTINUED | OUTPATIENT
Start: 2023-05-05 | End: 2023-05-08

## 2023-05-05 RX ORDER — SODIUM CHLORIDE 0.9 % (FLUSH) 0.9 %
5-40 SYRINGE (ML) INJECTION EVERY 12 HOURS SCHEDULED
Status: DISCONTINUED | OUTPATIENT
Start: 2023-05-05 | End: 2023-05-10 | Stop reason: HOSPADM

## 2023-05-05 RX ORDER — SODIUM CHLORIDE 9 MG/ML
INJECTION, SOLUTION INTRAVENOUS PRN
Status: DISCONTINUED | OUTPATIENT
Start: 2023-05-05 | End: 2023-05-10 | Stop reason: HOSPADM

## 2023-05-05 RX ORDER — ONDANSETRON 4 MG/1
4 TABLET, ORALLY DISINTEGRATING ORAL EVERY 8 HOURS PRN
Status: DISCONTINUED | OUTPATIENT
Start: 2023-05-05 | End: 2023-05-10 | Stop reason: HOSPADM

## 2023-05-05 RX ADMIN — ACETAMINOPHEN 1000 MG: 500 TABLET ORAL at 17:25

## 2023-05-05 RX ADMIN — OXYCODONE HYDROCHLORIDE 5 MG: 5 TABLET ORAL at 19:32

## 2023-05-05 ASSESSMENT — PAIN DESCRIPTION - PAIN TYPE: TYPE: ACUTE PAIN

## 2023-05-05 ASSESSMENT — PAIN DESCRIPTION - ONSET: ONSET: ON-GOING

## 2023-05-05 ASSESSMENT — PAIN DESCRIPTION - LOCATION: LOCATION: ANKLE

## 2023-05-05 ASSESSMENT — PAIN - FUNCTIONAL ASSESSMENT: PAIN_FUNCTIONAL_ASSESSMENT: PREVENTS OR INTERFERES SOME ACTIVE ACTIVITIES AND ADLS

## 2023-05-05 ASSESSMENT — PAIN SCALES - GENERAL
PAINLEVEL_OUTOF10: 5
PAINLEVEL_OUTOF10: 7

## 2023-05-05 ASSESSMENT — PAIN DESCRIPTION - DESCRIPTORS: DESCRIPTORS: ACHING

## 2023-05-05 ASSESSMENT — PAIN DESCRIPTION - FREQUENCY: FREQUENCY: CONTINUOUS

## 2023-05-05 ASSESSMENT — PAIN DESCRIPTION - ORIENTATION: ORIENTATION: RIGHT

## 2023-05-06 PROBLEM — S82.891A CLOSED FRACTURE OF RIGHT ANKLE: Status: ACTIVE | Noted: 2023-05-05

## 2023-05-06 LAB
ANION GAP SERPL CALCULATED.3IONS-SCNC: 11 MMOL/L (ref 3–16)
BASOPHILS # BLD: 0 K/UL (ref 0–0.2)
BASOPHILS NFR BLD: 0.7 %
BUN SERPL-MCNC: 17 MG/DL (ref 7–20)
CALCIUM SERPL-MCNC: 7.9 MG/DL (ref 8.3–10.6)
CHLORIDE SERPL-SCNC: 103 MMOL/L (ref 99–110)
CO2 SERPL-SCNC: 28 MMOL/L (ref 21–32)
CREAT SERPL-MCNC: 0.7 MG/DL (ref 0.6–1.2)
DEPRECATED RDW RBC AUTO: 16.8 % (ref 12.4–15.4)
EKG ATRIAL RATE: 80 BPM
EKG DIAGNOSIS: NORMAL
EKG P AXIS: 58 DEGREES
EKG P-R INTERVAL: 120 MS
EKG Q-T INTERVAL: 432 MS
EKG QRS DURATION: 134 MS
EKG QTC CALCULATION (BAZETT): 498 MS
EKG R AXIS: 22 DEGREES
EKG T AXIS: -22 DEGREES
EKG VENTRICULAR RATE: 80 BPM
EOSINOPHIL # BLD: 0.1 K/UL (ref 0–0.6)
EOSINOPHIL NFR BLD: 2.2 %
EST. AVERAGE GLUCOSE BLD GHB EST-MCNC: 148.5 MG/DL
GFR SERPLBLD CREATININE-BSD FMLA CKD-EPI: >60 ML/MIN/{1.73_M2}
GLUCOSE BLD-MCNC: 165 MG/DL (ref 70–99)
GLUCOSE BLD-MCNC: 176 MG/DL (ref 70–99)
GLUCOSE BLD-MCNC: 176 MG/DL (ref 70–99)
GLUCOSE BLD-MCNC: 177 MG/DL (ref 70–99)
GLUCOSE SERPL-MCNC: 156 MG/DL (ref 70–99)
HBA1C MFR BLD: 6.8 %
HCT VFR BLD AUTO: 31.7 % (ref 36–48)
HGB BLD-MCNC: 10.4 G/DL (ref 12–16)
LYMPHOCYTES # BLD: 0.5 K/UL (ref 1–5.1)
LYMPHOCYTES NFR BLD: 10.2 %
MAGNESIUM SERPL-MCNC: 1.2 MG/DL (ref 1.8–2.4)
MCH RBC QN AUTO: 28.9 PG (ref 26–34)
MCHC RBC AUTO-ENTMCNC: 32.7 G/DL (ref 31–36)
MCV RBC AUTO: 88.5 FL (ref 80–100)
MONOCYTES # BLD: 0.6 K/UL (ref 0–1.3)
MONOCYTES NFR BLD: 12.1 %
NEUTROPHILS # BLD: 4 K/UL (ref 1.7–7.7)
NEUTROPHILS NFR BLD: 74.8 %
PERFORMED ON: ABNORMAL
PLATELET # BLD AUTO: 178 K/UL (ref 135–450)
PMV BLD AUTO: 10.5 FL (ref 5–10.5)
POTASSIUM SERPL-SCNC: 3.3 MMOL/L (ref 3.5–5.1)
RBC # BLD AUTO: 3.58 M/UL (ref 4–5.2)
SODIUM SERPL-SCNC: 142 MMOL/L (ref 136–145)
WBC # BLD AUTO: 5.4 K/UL (ref 4–11)

## 2023-05-06 PROCEDURE — 2060000000 HC ICU INTERMEDIATE R&B

## 2023-05-06 PROCEDURE — 83036 HEMOGLOBIN GLYCOSYLATED A1C: CPT

## 2023-05-06 PROCEDURE — 80048 BASIC METABOLIC PNL TOTAL CA: CPT

## 2023-05-06 PROCEDURE — 6370000000 HC RX 637 (ALT 250 FOR IP): Performed by: STUDENT IN AN ORGANIZED HEALTH CARE EDUCATION/TRAINING PROGRAM

## 2023-05-06 PROCEDURE — 6360000002 HC RX W HCPCS: Performed by: HOSPITALIST

## 2023-05-06 PROCEDURE — 99222 1ST HOSP IP/OBS MODERATE 55: CPT | Performed by: ORTHOPAEDIC SURGERY

## 2023-05-06 PROCEDURE — 6370000000 HC RX 637 (ALT 250 FOR IP): Performed by: HOSPITALIST

## 2023-05-06 PROCEDURE — 6370000000 HC RX 637 (ALT 250 FOR IP): Performed by: NURSE PRACTITIONER

## 2023-05-06 PROCEDURE — 36415 COLL VENOUS BLD VENIPUNCTURE: CPT

## 2023-05-06 PROCEDURE — 93010 ELECTROCARDIOGRAM REPORT: CPT | Performed by: INTERNAL MEDICINE

## 2023-05-06 PROCEDURE — 2580000003 HC RX 258: Performed by: HOSPITALIST

## 2023-05-06 PROCEDURE — 85025 COMPLETE CBC W/AUTO DIFF WBC: CPT

## 2023-05-06 PROCEDURE — 94760 N-INVAS EAR/PLS OXIMETRY 1: CPT

## 2023-05-06 PROCEDURE — 83735 ASSAY OF MAGNESIUM: CPT

## 2023-05-06 RX ORDER — AMLODIPINE BESYLATE 5 MG/1
5 TABLET ORAL DAILY
Status: DISCONTINUED | OUTPATIENT
Start: 2023-05-06 | End: 2023-05-10 | Stop reason: HOSPADM

## 2023-05-06 RX ORDER — HYDROXYCHLOROQUINE SULFATE 200 MG/1
200 TABLET, FILM COATED ORAL 2 TIMES DAILY
Status: DISCONTINUED | OUTPATIENT
Start: 2023-05-06 | End: 2023-05-10 | Stop reason: HOSPADM

## 2023-05-06 RX ORDER — OXYCODONE HYDROCHLORIDE 5 MG/1
5 TABLET ORAL EVERY 4 HOURS PRN
Status: DISCONTINUED | OUTPATIENT
Start: 2023-05-06 | End: 2023-05-08

## 2023-05-06 RX ORDER — PANTOPRAZOLE SODIUM 40 MG/1
40 TABLET, DELAYED RELEASE ORAL
Status: DISCONTINUED | OUTPATIENT
Start: 2023-05-06 | End: 2023-05-10 | Stop reason: HOSPADM

## 2023-05-06 RX ORDER — FERROUS SULFATE TAB EC 324 MG (65 MG FE EQUIVALENT) 324 (65 FE) MG
324 TABLET DELAYED RESPONSE ORAL
Status: DISCONTINUED | OUTPATIENT
Start: 2023-05-06 | End: 2023-05-10 | Stop reason: HOSPADM

## 2023-05-06 RX ORDER — DEXTROSE MONOHYDRATE 100 MG/ML
INJECTION, SOLUTION INTRAVENOUS CONTINUOUS PRN
Status: DISCONTINUED | OUTPATIENT
Start: 2023-05-06 | End: 2023-05-10 | Stop reason: HOSPADM

## 2023-05-06 RX ORDER — OXYCODONE HYDROCHLORIDE 5 MG/1
5 TABLET ORAL EVERY 4 HOURS PRN
Status: DISCONTINUED | OUTPATIENT
Start: 2023-05-06 | End: 2023-05-06

## 2023-05-06 RX ORDER — FOLIC ACID 1 MG/1
1 TABLET ORAL DAILY
Status: DISCONTINUED | OUTPATIENT
Start: 2023-05-06 | End: 2023-05-10 | Stop reason: HOSPADM

## 2023-05-06 RX ORDER — POTASSIUM CHLORIDE 20 MEQ/1
40 TABLET, EXTENDED RELEASE ORAL ONCE
Status: DISCONTINUED | OUTPATIENT
Start: 2023-05-06 | End: 2023-05-07

## 2023-05-06 RX ORDER — MAGNESIUM SULFATE IN WATER 40 MG/ML
4000 INJECTION, SOLUTION INTRAVENOUS ONCE
Status: DISCONTINUED | OUTPATIENT
Start: 2023-05-06 | End: 2023-05-07

## 2023-05-06 RX ORDER — INSULIN LISPRO 100 [IU]/ML
0-4 INJECTION, SOLUTION INTRAVENOUS; SUBCUTANEOUS NIGHTLY
Status: DISCONTINUED | OUTPATIENT
Start: 2023-05-06 | End: 2023-05-10 | Stop reason: HOSPADM

## 2023-05-06 RX ORDER — OXYCODONE HYDROCHLORIDE 10 MG/1
10 TABLET ORAL EVERY 4 HOURS PRN
Status: DISCONTINUED | OUTPATIENT
Start: 2023-05-06 | End: 2023-05-08

## 2023-05-06 RX ORDER — INSULIN LISPRO 100 [IU]/ML
0-4 INJECTION, SOLUTION INTRAVENOUS; SUBCUTANEOUS
Status: DISCONTINUED | OUTPATIENT
Start: 2023-05-06 | End: 2023-05-10 | Stop reason: HOSPADM

## 2023-05-06 RX ADMIN — ACETAMINOPHEN 650 MG: 325 TABLET ORAL at 10:55

## 2023-05-06 RX ADMIN — PANTOPRAZOLE SODIUM 40 MG: 40 TABLET, DELAYED RELEASE ORAL at 04:53

## 2023-05-06 RX ADMIN — HYDROXYCHLOROQUINE SULFATE 200 MG: 200 TABLET ORAL at 10:56

## 2023-05-06 RX ADMIN — ENOXAPARIN SODIUM 30 MG: 100 INJECTION SUBCUTANEOUS at 22:25

## 2023-05-06 RX ADMIN — LISINOPRIL 30 MG: 20 TABLET ORAL at 10:56

## 2023-05-06 RX ADMIN — ACETAMINOPHEN 650 MG: 325 TABLET ORAL at 22:00

## 2023-05-06 RX ADMIN — FOLIC ACID 1 MG: 1 TABLET ORAL at 10:56

## 2023-05-06 RX ADMIN — AMLODIPINE BESYLATE 5 MG: 5 TABLET ORAL at 10:56

## 2023-05-06 RX ADMIN — OXYCODONE HYDROCHLORIDE 5 MG: 5 TABLET ORAL at 04:52

## 2023-05-06 RX ADMIN — ACETAMINOPHEN 650 MG: 325 TABLET ORAL at 04:53

## 2023-05-06 RX ADMIN — HYDROXYCHLOROQUINE SULFATE 200 MG: 200 TABLET ORAL at 20:17

## 2023-05-06 RX ADMIN — OXYCODONE HYDROCHLORIDE 5 MG: 5 TABLET ORAL at 02:03

## 2023-05-06 RX ADMIN — Medication 10 ML: at 11:01

## 2023-05-06 ASSESSMENT — PAIN - FUNCTIONAL ASSESSMENT
PAIN_FUNCTIONAL_ASSESSMENT: ACTIVITIES ARE NOT PREVENTED
PAIN_FUNCTIONAL_ASSESSMENT: PREVENTS OR INTERFERES SOME ACTIVE ACTIVITIES AND ADLS

## 2023-05-06 ASSESSMENT — PAIN DESCRIPTION - ONSET
ONSET: ON-GOING
ONSET: ON-GOING

## 2023-05-06 ASSESSMENT — PAIN DESCRIPTION - FREQUENCY
FREQUENCY: CONTINUOUS
FREQUENCY: CONTINUOUS

## 2023-05-06 ASSESSMENT — PAIN DESCRIPTION - PAIN TYPE
TYPE: ACUTE PAIN
TYPE: ACUTE PAIN

## 2023-05-06 ASSESSMENT — PAIN SCALES - GENERAL
PAINLEVEL_OUTOF10: 10
PAINLEVEL_OUTOF10: 6
PAINLEVEL_OUTOF10: 7
PAINLEVEL_OUTOF10: 3

## 2023-05-06 ASSESSMENT — PAIN DESCRIPTION - LOCATION
LOCATION: ANKLE

## 2023-05-06 ASSESSMENT — PAIN DESCRIPTION - ORIENTATION
ORIENTATION: RIGHT

## 2023-05-06 ASSESSMENT — PAIN SCALES - WONG BAKER: WONGBAKER_NUMERICALRESPONSE: 0

## 2023-05-06 ASSESSMENT — PAIN DESCRIPTION - DESCRIPTORS
DESCRIPTORS: ACHING;DISCOMFORT
DESCRIPTORS: ACHING

## 2023-05-07 LAB
ABO + RH BLD: NORMAL
ALBUMIN SERPL-MCNC: 3.5 G/DL (ref 3.4–5)
ALBUMIN/GLOB SERPL: 1.3 {RATIO} (ref 1.1–2.2)
ALP SERPL-CCNC: 72 U/L (ref 40–129)
ALT SERPL-CCNC: <5 U/L (ref 10–40)
ANION GAP SERPL CALCULATED.3IONS-SCNC: 10 MMOL/L (ref 3–16)
AST SERPL-CCNC: 16 U/L (ref 15–37)
BASOPHILS # BLD: 0 K/UL (ref 0–0.2)
BASOPHILS NFR BLD: 0.7 %
BILIRUB SERPL-MCNC: 0.5 MG/DL (ref 0–1)
BLD GP AB SCN SERPL QL: NORMAL
BUN SERPL-MCNC: 13 MG/DL (ref 7–20)
CALCIUM SERPL-MCNC: 7.7 MG/DL (ref 8.3–10.6)
CHLORIDE SERPL-SCNC: 100 MMOL/L (ref 99–110)
CO2 SERPL-SCNC: 28 MMOL/L (ref 21–32)
CREAT SERPL-MCNC: 0.9 MG/DL (ref 0.6–1.2)
DEPRECATED RDW RBC AUTO: 17.2 % (ref 12.4–15.4)
EOSINOPHIL # BLD: 0.2 K/UL (ref 0–0.6)
EOSINOPHIL NFR BLD: 3 %
GFR SERPLBLD CREATININE-BSD FMLA CKD-EPI: >60 ML/MIN/{1.73_M2}
GLUCOSE BLD-MCNC: 136 MG/DL (ref 70–99)
GLUCOSE BLD-MCNC: 150 MG/DL (ref 70–99)
GLUCOSE BLD-MCNC: 155 MG/DL (ref 70–99)
GLUCOSE BLD-MCNC: 166 MG/DL (ref 70–99)
GLUCOSE SERPL-MCNC: 174 MG/DL (ref 70–99)
HCT VFR BLD AUTO: 32.6 % (ref 36–48)
HGB BLD-MCNC: 10.5 G/DL (ref 12–16)
LYMPHOCYTES # BLD: 0.6 K/UL (ref 1–5.1)
LYMPHOCYTES NFR BLD: 10.3 %
MAGNESIUM SERPL-MCNC: 1.4 MG/DL (ref 1.8–2.4)
MCH RBC QN AUTO: 28.1 PG (ref 26–34)
MCHC RBC AUTO-ENTMCNC: 32.3 G/DL (ref 31–36)
MCV RBC AUTO: 86.9 FL (ref 80–100)
MONOCYTES # BLD: 0.7 K/UL (ref 0–1.3)
MONOCYTES NFR BLD: 11.9 %
NEUTROPHILS # BLD: 4.1 K/UL (ref 1.7–7.7)
NEUTROPHILS NFR BLD: 74.1 %
PERFORMED ON: ABNORMAL
PLATELET # BLD AUTO: 189 K/UL (ref 135–450)
PMV BLD AUTO: 10.1 FL (ref 5–10.5)
POTASSIUM SERPL-SCNC: 3.2 MMOL/L (ref 3.5–5.1)
PROT SERPL-MCNC: 6.3 G/DL (ref 6.4–8.2)
RBC # BLD AUTO: 3.75 M/UL (ref 4–5.2)
SODIUM SERPL-SCNC: 138 MMOL/L (ref 136–145)
WBC # BLD AUTO: 5.6 K/UL (ref 4–11)

## 2023-05-07 PROCEDURE — 86850 RBC ANTIBODY SCREEN: CPT

## 2023-05-07 PROCEDURE — 6360000002 HC RX W HCPCS: Performed by: INTERNAL MEDICINE

## 2023-05-07 PROCEDURE — 86901 BLOOD TYPING SEROLOGIC RH(D): CPT

## 2023-05-07 PROCEDURE — 83735 ASSAY OF MAGNESIUM: CPT

## 2023-05-07 PROCEDURE — 6370000000 HC RX 637 (ALT 250 FOR IP): Performed by: INTERNAL MEDICINE

## 2023-05-07 PROCEDURE — 6370000000 HC RX 637 (ALT 250 FOR IP): Performed by: NURSE PRACTITIONER

## 2023-05-07 PROCEDURE — 2580000003 HC RX 258: Performed by: HOSPITALIST

## 2023-05-07 PROCEDURE — 86900 BLOOD TYPING SEROLOGIC ABO: CPT

## 2023-05-07 PROCEDURE — 2060000000 HC ICU INTERMEDIATE R&B

## 2023-05-07 PROCEDURE — 6360000002 HC RX W HCPCS: Performed by: HOSPITALIST

## 2023-05-07 PROCEDURE — 85025 COMPLETE CBC W/AUTO DIFF WBC: CPT

## 2023-05-07 PROCEDURE — 36415 COLL VENOUS BLD VENIPUNCTURE: CPT

## 2023-05-07 PROCEDURE — 80053 COMPREHEN METABOLIC PANEL: CPT

## 2023-05-07 PROCEDURE — 6370000000 HC RX 637 (ALT 250 FOR IP): Performed by: HOSPITALIST

## 2023-05-07 RX ORDER — POTASSIUM CHLORIDE 20 MEQ/1
40 TABLET, EXTENDED RELEASE ORAL ONCE
Status: COMPLETED | OUTPATIENT
Start: 2023-05-07 | End: 2023-05-07

## 2023-05-07 RX ORDER — MAGNESIUM SULFATE IN WATER 40 MG/ML
4000 INJECTION, SOLUTION INTRAVENOUS ONCE
Status: COMPLETED | OUTPATIENT
Start: 2023-05-07 | End: 2023-05-07

## 2023-05-07 RX ORDER — CALCIUM CARBONATE 200(500)MG
500 TABLET,CHEWABLE ORAL 3 TIMES DAILY PRN
Status: DISCONTINUED | OUTPATIENT
Start: 2023-05-07 | End: 2023-05-10 | Stop reason: HOSPADM

## 2023-05-07 RX ADMIN — PANTOPRAZOLE SODIUM 40 MG: 40 TABLET, DELAYED RELEASE ORAL at 06:41

## 2023-05-07 RX ADMIN — FOLIC ACID 1 MG: 1 TABLET ORAL at 09:03

## 2023-05-07 RX ADMIN — Medication 10 ML: at 09:21

## 2023-05-07 RX ADMIN — ACETAMINOPHEN 650 MG: 325 TABLET ORAL at 18:44

## 2023-05-07 RX ADMIN — ANTACID TABLETS 500 MG: 500 TABLET, CHEWABLE ORAL at 10:39

## 2023-05-07 RX ADMIN — LISINOPRIL 30 MG: 20 TABLET ORAL at 09:03

## 2023-05-07 RX ADMIN — ENOXAPARIN SODIUM 30 MG: 100 INJECTION SUBCUTANEOUS at 20:26

## 2023-05-07 RX ADMIN — AMLODIPINE BESYLATE 5 MG: 5 TABLET ORAL at 09:03

## 2023-05-07 RX ADMIN — ENOXAPARIN SODIUM 30 MG: 100 INJECTION SUBCUTANEOUS at 09:03

## 2023-05-07 RX ADMIN — MAGNESIUM SULFATE HEPTAHYDRATE 4000 MG: 40 INJECTION, SOLUTION INTRAVENOUS at 09:16

## 2023-05-07 RX ADMIN — HYDROXYCHLOROQUINE SULFATE 200 MG: 200 TABLET ORAL at 20:25

## 2023-05-07 RX ADMIN — POTASSIUM CHLORIDE 40 MEQ: 1500 TABLET, EXTENDED RELEASE ORAL at 09:03

## 2023-05-07 RX ADMIN — HYDROXYCHLOROQUINE SULFATE 200 MG: 200 TABLET ORAL at 09:03

## 2023-05-07 ASSESSMENT — PAIN DESCRIPTION - DESCRIPTORS: DESCRIPTORS: ACHING

## 2023-05-07 ASSESSMENT — PAIN DESCRIPTION - ORIENTATION: ORIENTATION: MID

## 2023-05-07 ASSESSMENT — PAIN DESCRIPTION - LOCATION: LOCATION: HEAD

## 2023-05-08 ENCOUNTER — ANESTHESIA EVENT (OUTPATIENT)
Dept: OPERATING ROOM | Age: 73
DRG: 493 | End: 2023-05-08
Payer: MEDICARE

## 2023-05-08 ENCOUNTER — APPOINTMENT (OUTPATIENT)
Dept: GENERAL RADIOLOGY | Age: 73
DRG: 493 | End: 2023-05-08
Payer: MEDICARE

## 2023-05-08 ENCOUNTER — ANESTHESIA (OUTPATIENT)
Dept: OPERATING ROOM | Age: 73
DRG: 493 | End: 2023-05-08
Payer: MEDICARE

## 2023-05-08 LAB
ALBUMIN SERPL-MCNC: 3.6 G/DL (ref 3.4–5)
ALBUMIN/GLOB SERPL: 1.2 {RATIO} (ref 1.1–2.2)
ALP SERPL-CCNC: 78 U/L (ref 40–129)
ALT SERPL-CCNC: 6 U/L (ref 10–40)
ANION GAP SERPL CALCULATED.3IONS-SCNC: 10 MMOL/L (ref 3–16)
AST SERPL-CCNC: 11 U/L (ref 15–37)
BASOPHILS # BLD: 0 K/UL (ref 0–0.2)
BASOPHILS NFR BLD: 0.8 %
BILIRUB SERPL-MCNC: 0.5 MG/DL (ref 0–1)
BUN SERPL-MCNC: 14 MG/DL (ref 7–20)
CALCIUM SERPL-MCNC: 8.5 MG/DL (ref 8.3–10.6)
CHLORIDE SERPL-SCNC: 101 MMOL/L (ref 99–110)
CO2 SERPL-SCNC: 27 MMOL/L (ref 21–32)
CREAT SERPL-MCNC: 0.9 MG/DL (ref 0.6–1.2)
DEPRECATED RDW RBC AUTO: 17.4 % (ref 12.4–15.4)
EOSINOPHIL # BLD: 0.2 K/UL (ref 0–0.6)
EOSINOPHIL NFR BLD: 3.2 %
GFR SERPLBLD CREATININE-BSD FMLA CKD-EPI: >60 ML/MIN/{1.73_M2}
GLUCOSE BLD-MCNC: 132 MG/DL (ref 70–99)
GLUCOSE BLD-MCNC: 160 MG/DL (ref 70–99)
GLUCOSE BLD-MCNC: 181 MG/DL (ref 70–99)
GLUCOSE BLD-MCNC: 244 MG/DL (ref 70–99)
GLUCOSE BLD-MCNC: 295 MG/DL (ref 70–99)
GLUCOSE SERPL-MCNC: 159 MG/DL (ref 70–99)
HCT VFR BLD AUTO: 33.7 % (ref 36–48)
HGB BLD-MCNC: 10.8 G/DL (ref 12–16)
LYMPHOCYTES # BLD: 0.6 K/UL (ref 1–5.1)
LYMPHOCYTES NFR BLD: 9.7 %
MCH RBC QN AUTO: 28 PG (ref 26–34)
MCHC RBC AUTO-ENTMCNC: 32.1 G/DL (ref 31–36)
MCV RBC AUTO: 87.4 FL (ref 80–100)
MONOCYTES # BLD: 0.7 K/UL (ref 0–1.3)
MONOCYTES NFR BLD: 11.3 %
NEUTROPHILS # BLD: 4.5 K/UL (ref 1.7–7.7)
NEUTROPHILS NFR BLD: 75 %
PERFORMED ON: ABNORMAL
PLATELET # BLD AUTO: 196 K/UL (ref 135–450)
PMV BLD AUTO: 10.4 FL (ref 5–10.5)
POTASSIUM SERPL-SCNC: 4.1 MMOL/L (ref 3.5–5.1)
PROT SERPL-MCNC: 6.6 G/DL (ref 6.4–8.2)
RBC # BLD AUTO: 3.85 M/UL (ref 4–5.2)
SODIUM SERPL-SCNC: 138 MMOL/L (ref 136–145)
WBC # BLD AUTO: 6 K/UL (ref 4–11)

## 2023-05-08 PROCEDURE — 36415 COLL VENOUS BLD VENIPUNCTURE: CPT

## 2023-05-08 PROCEDURE — 80053 COMPREHEN METABOLIC PANEL: CPT

## 2023-05-08 PROCEDURE — 3700000001 HC ADD 15 MINUTES (ANESTHESIA): Performed by: ORTHOPAEDIC SURGERY

## 2023-05-08 PROCEDURE — 0QSG04Z REPOSITION RIGHT TIBIA WITH INTERNAL FIXATION DEVICE, OPEN APPROACH: ICD-10-PCS | Performed by: ORTHOPAEDIC SURGERY

## 2023-05-08 PROCEDURE — 2060000000 HC ICU INTERMEDIATE R&B

## 2023-05-08 PROCEDURE — 94760 N-INVAS EAR/PLS OXIMETRY 1: CPT

## 2023-05-08 PROCEDURE — 76000 FLUOROSCOPY <1 HR PHYS/QHP: CPT | Performed by: ORTHOPAEDIC SURGERY

## 2023-05-08 PROCEDURE — 7100000001 HC PACU RECOVERY - ADDTL 15 MIN: Performed by: ORTHOPAEDIC SURGERY

## 2023-05-08 PROCEDURE — C1713 ANCHOR/SCREW BN/BN,TIS/BN: HCPCS | Performed by: ORTHOPAEDIC SURGERY

## 2023-05-08 PROCEDURE — 7100000000 HC PACU RECOVERY - FIRST 15 MIN: Performed by: ORTHOPAEDIC SURGERY

## 2023-05-08 PROCEDURE — 27822 TREATMENT OF ANKLE FRACTURE: CPT | Performed by: ORTHOPAEDIC SURGERY

## 2023-05-08 PROCEDURE — 2500000003 HC RX 250 WO HCPCS: Performed by: NURSE ANESTHETIST, CERTIFIED REGISTERED

## 2023-05-08 PROCEDURE — 3600000005 HC SURGERY LEVEL 5 BASE: Performed by: ORTHOPAEDIC SURGERY

## 2023-05-08 PROCEDURE — 6370000000 HC RX 637 (ALT 250 FOR IP): Performed by: NURSE PRACTITIONER

## 2023-05-08 PROCEDURE — 3700000000 HC ANESTHESIA ATTENDED CARE: Performed by: ORTHOPAEDIC SURGERY

## 2023-05-08 PROCEDURE — 2580000003 HC RX 258: Performed by: HOSPITALIST

## 2023-05-08 PROCEDURE — 3209999900 FLUORO FOR SURGICAL PROCEDURES

## 2023-05-08 PROCEDURE — A4217 STERILE WATER/SALINE, 500 ML: HCPCS | Performed by: ORTHOPAEDIC SURGERY

## 2023-05-08 PROCEDURE — 2500000003 HC RX 250 WO HCPCS: Performed by: ORTHOPAEDIC SURGERY

## 2023-05-08 PROCEDURE — 2580000003 HC RX 258: Performed by: ORTHOPAEDIC SURGERY

## 2023-05-08 PROCEDURE — 6370000000 HC RX 637 (ALT 250 FOR IP): Performed by: ORTHOPAEDIC SURGERY

## 2023-05-08 PROCEDURE — 6360000002 HC RX W HCPCS: Performed by: ANESTHESIOLOGY

## 2023-05-08 PROCEDURE — 2709999900 HC NON-CHARGEABLE SUPPLY: Performed by: ORTHOPAEDIC SURGERY

## 2023-05-08 PROCEDURE — 2720000010 HC SURG SUPPLY STERILE: Performed by: ORTHOPAEDIC SURGERY

## 2023-05-08 PROCEDURE — 6360000002 HC RX W HCPCS: Performed by: NURSE ANESTHETIST, CERTIFIED REGISTERED

## 2023-05-08 PROCEDURE — 3600000015 HC SURGERY LEVEL 5 ADDTL 15MIN: Performed by: ORTHOPAEDIC SURGERY

## 2023-05-08 PROCEDURE — 85025 COMPLETE CBC W/AUTO DIFF WBC: CPT

## 2023-05-08 PROCEDURE — 73610 X-RAY EXAM OF ANKLE: CPT

## 2023-05-08 PROCEDURE — 6360000002 HC RX W HCPCS: Performed by: ORTHOPAEDIC SURGERY

## 2023-05-08 PROCEDURE — 0QSJ04Z REPOSITION RIGHT FIBULA WITH INTERNAL FIXATION DEVICE, OPEN APPROACH: ICD-10-PCS | Performed by: ORTHOPAEDIC SURGERY

## 2023-05-08 DEVICE — SCREW BNE L14MM DIA2.7MM CORT S STL ST LOK FULL THRD T8: Type: IMPLANTABLE DEVICE | Site: ANKLE | Status: FUNCTIONAL

## 2023-05-08 DEVICE — SCREW BNE L35MM DIA4MM CANC S STL PARTIALLY THRD SM HEX: Type: IMPLANTABLE DEVICE | Site: ANKLE | Status: FUNCTIONAL

## 2023-05-08 DEVICE — SCREW BNE L12MM DIA2.7MM CORT S STL ST LOK FULL THRD T8: Type: IMPLANTABLE DEVICE | Site: ANKLE | Status: FUNCTIONAL

## 2023-05-08 DEVICE — SCREW BNE L16MM DIA2.7MM CORT S STL ST LOK FULL THRD T8: Type: IMPLANTABLE DEVICE | Site: ANKLE | Status: FUNCTIONAL

## 2023-05-08 DEVICE — SCREW BNE L12MM DIA3.5MM CORT S STL ST NONCANNULATED LOK: Type: IMPLANTABLE DEVICE | Site: ANKLE | Status: FUNCTIONAL

## 2023-05-08 DEVICE — PLATE BNE L86MM 4 H R DST LAT FIBULAR S STL LOK COMPR FOR: Type: IMPLANTABLE DEVICE | Site: ANKLE | Status: FUNCTIONAL

## 2023-05-08 DEVICE — SCREW BNE L40MM DIA4MM CANC S STL PARTIALLY THRD SM HEX: Type: IMPLANTABLE DEVICE | Site: ANKLE | Status: FUNCTIONAL

## 2023-05-08 DEVICE — SCREW BNE L16MM DIA3.5MM CORT S STL ST NONCANNULATED LOK: Type: IMPLANTABLE DEVICE | Site: ANKLE | Status: FUNCTIONAL

## 2023-05-08 RX ORDER — PROPOFOL 10 MG/ML
INJECTION, EMULSION INTRAVENOUS PRN
Status: DISCONTINUED | OUTPATIENT
Start: 2023-05-08 | End: 2023-05-08 | Stop reason: SDUPTHER

## 2023-05-08 RX ORDER — SENNA AND DOCUSATE SODIUM 50; 8.6 MG/1; MG/1
1 TABLET, FILM COATED ORAL 2 TIMES DAILY
Status: DISCONTINUED | OUTPATIENT
Start: 2023-05-08 | End: 2023-05-10 | Stop reason: HOSPADM

## 2023-05-08 RX ORDER — OXYCODONE HYDROCHLORIDE 10 MG/1
10 TABLET ORAL EVERY 4 HOURS PRN
Status: DISCONTINUED | OUTPATIENT
Start: 2023-05-08 | End: 2023-05-10 | Stop reason: HOSPADM

## 2023-05-08 RX ORDER — OXYCODONE HYDROCHLORIDE 5 MG/1
5 TABLET ORAL
Status: DISCONTINUED | OUTPATIENT
Start: 2023-05-08 | End: 2023-05-08 | Stop reason: HOSPADM

## 2023-05-08 RX ORDER — ONDANSETRON 2 MG/ML
INJECTION INTRAMUSCULAR; INTRAVENOUS PRN
Status: DISCONTINUED | OUTPATIENT
Start: 2023-05-08 | End: 2023-05-08 | Stop reason: SDUPTHER

## 2023-05-08 RX ORDER — SODIUM CHLORIDE 0.9 % (FLUSH) 0.9 %
5-40 SYRINGE (ML) INJECTION EVERY 12 HOURS SCHEDULED
Status: DISCONTINUED | OUTPATIENT
Start: 2023-05-08 | End: 2023-05-08 | Stop reason: HOSPADM

## 2023-05-08 RX ORDER — CEFAZOLIN SODIUM 1 G/3ML
INJECTION, POWDER, FOR SOLUTION INTRAMUSCULAR; INTRAVENOUS PRN
Status: DISCONTINUED | OUTPATIENT
Start: 2023-05-08 | End: 2023-05-08 | Stop reason: SDUPTHER

## 2023-05-08 RX ORDER — FENTANYL CITRATE 50 UG/ML
25 INJECTION, SOLUTION INTRAMUSCULAR; INTRAVENOUS EVERY 5 MIN PRN
Status: DISCONTINUED | OUTPATIENT
Start: 2023-05-08 | End: 2023-05-08 | Stop reason: HOSPADM

## 2023-05-08 RX ORDER — DEXAMETHASONE SODIUM PHOSPHATE 4 MG/ML
INJECTION, SOLUTION INTRA-ARTICULAR; INTRALESIONAL; INTRAMUSCULAR; INTRAVENOUS; SOFT TISSUE PRN
Status: DISCONTINUED | OUTPATIENT
Start: 2023-05-08 | End: 2023-05-08 | Stop reason: SDUPTHER

## 2023-05-08 RX ORDER — ONDANSETRON 2 MG/ML
4 INJECTION INTRAMUSCULAR; INTRAVENOUS
Status: DISCONTINUED | OUTPATIENT
Start: 2023-05-08 | End: 2023-05-08 | Stop reason: HOSPADM

## 2023-05-08 RX ORDER — ENOXAPARIN SODIUM 100 MG/ML
30 INJECTION SUBCUTANEOUS 2 TIMES DAILY
Status: DISCONTINUED | OUTPATIENT
Start: 2023-05-09 | End: 2023-05-10 | Stop reason: HOSPADM

## 2023-05-08 RX ORDER — SODIUM CHLORIDE 9 MG/ML
INJECTION, SOLUTION INTRAVENOUS PRN
Status: DISCONTINUED | OUTPATIENT
Start: 2023-05-08 | End: 2023-05-10 | Stop reason: HOSPADM

## 2023-05-08 RX ORDER — OXYCODONE HYDROCHLORIDE 5 MG/1
5 TABLET ORAL EVERY 4 HOURS PRN
Status: DISCONTINUED | OUTPATIENT
Start: 2023-05-08 | End: 2023-05-10 | Stop reason: HOSPADM

## 2023-05-08 RX ORDER — LIDOCAINE HYDROCHLORIDE 20 MG/ML
INJECTION, SOLUTION EPIDURAL; INFILTRATION; INTRACAUDAL; PERINEURAL PRN
Status: DISCONTINUED | OUTPATIENT
Start: 2023-05-08 | End: 2023-05-08 | Stop reason: SDUPTHER

## 2023-05-08 RX ORDER — BUPIVACAINE HYDROCHLORIDE 2.5 MG/ML
INJECTION, SOLUTION EPIDURAL; INFILTRATION; INTRACAUDAL
Status: COMPLETED | OUTPATIENT
Start: 2023-05-08 | End: 2023-05-08

## 2023-05-08 RX ORDER — SODIUM CHLORIDE 0.9 % (FLUSH) 0.9 %
5-40 SYRINGE (ML) INJECTION PRN
Status: DISCONTINUED | OUTPATIENT
Start: 2023-05-08 | End: 2023-05-10 | Stop reason: HOSPADM

## 2023-05-08 RX ORDER — DROPERIDOL 2.5 MG/ML
0.62 INJECTION, SOLUTION INTRAMUSCULAR; INTRAVENOUS
Status: DISCONTINUED | OUTPATIENT
Start: 2023-05-08 | End: 2023-05-08 | Stop reason: HOSPADM

## 2023-05-08 RX ORDER — SODIUM CHLORIDE 9 MG/ML
INJECTION, SOLUTION INTRAVENOUS PRN
Status: DISCONTINUED | OUTPATIENT
Start: 2023-05-08 | End: 2023-05-08 | Stop reason: HOSPADM

## 2023-05-08 RX ORDER — MAGNESIUM HYDROXIDE 1200 MG/15ML
LIQUID ORAL CONTINUOUS PRN
Status: COMPLETED | OUTPATIENT
Start: 2023-05-08 | End: 2023-05-08

## 2023-05-08 RX ORDER — SODIUM CHLORIDE 9 MG/ML
INJECTION, SOLUTION INTRAVENOUS CONTINUOUS
Status: DISCONTINUED | OUTPATIENT
Start: 2023-05-08 | End: 2023-05-10 | Stop reason: HOSPADM

## 2023-05-08 RX ORDER — FENTANYL CITRATE 50 UG/ML
INJECTION, SOLUTION INTRAMUSCULAR; INTRAVENOUS PRN
Status: DISCONTINUED | OUTPATIENT
Start: 2023-05-08 | End: 2023-05-08 | Stop reason: SDUPTHER

## 2023-05-08 RX ORDER — SODIUM CHLORIDE 0.9 % (FLUSH) 0.9 %
5-40 SYRINGE (ML) INJECTION EVERY 12 HOURS SCHEDULED
Status: DISCONTINUED | OUTPATIENT
Start: 2023-05-08 | End: 2023-05-10 | Stop reason: HOSPADM

## 2023-05-08 RX ORDER — SODIUM CHLORIDE 0.9 % (FLUSH) 0.9 %
5-40 SYRINGE (ML) INJECTION PRN
Status: DISCONTINUED | OUTPATIENT
Start: 2023-05-08 | End: 2023-05-08 | Stop reason: HOSPADM

## 2023-05-08 RX ADMIN — HYDROMORPHONE HYDROCHLORIDE 0.5 MG: 1 INJECTION, SOLUTION INTRAMUSCULAR; INTRAVENOUS; SUBCUTANEOUS at 14:33

## 2023-05-08 RX ADMIN — DEXAMETHASONE SODIUM PHOSPHATE 4 MG: 4 INJECTION, SOLUTION INTRAMUSCULAR; INTRAVENOUS at 11:34

## 2023-05-08 RX ADMIN — INSULIN LISPRO 2 UNITS: 100 INJECTION, SOLUTION INTRAVENOUS; SUBCUTANEOUS at 17:51

## 2023-05-08 RX ADMIN — PROPOFOL 120 MG: 10 INJECTION, EMULSION INTRAVENOUS at 11:28

## 2023-05-08 RX ADMIN — ONDANSETRON 4 MG: 2 INJECTION INTRAMUSCULAR; INTRAVENOUS at 11:34

## 2023-05-08 RX ADMIN — OXYCODONE HYDROCHLORIDE 10 MG: 10 TABLET ORAL at 15:52

## 2023-05-08 RX ADMIN — SODIUM CHLORIDE: 9 INJECTION, SOLUTION INTRAVENOUS at 15:48

## 2023-05-08 RX ADMIN — Medication 10 ML: at 08:39

## 2023-05-08 RX ADMIN — HYDROXYCHLOROQUINE SULFATE 200 MG: 200 TABLET ORAL at 20:41

## 2023-05-08 RX ADMIN — FENTANYL CITRATE 50 MCG: 50 INJECTION INTRAMUSCULAR; INTRAVENOUS at 11:24

## 2023-05-08 RX ADMIN — FENTANYL CITRATE 25 MCG: 50 INJECTION, SOLUTION INTRAMUSCULAR; INTRAVENOUS at 13:36

## 2023-05-08 RX ADMIN — LISINOPRIL 30 MG: 20 TABLET ORAL at 08:36

## 2023-05-08 RX ADMIN — CEFAZOLIN 2000 MG: 2 INJECTION, POWDER, FOR SOLUTION INTRAMUSCULAR; INTRAVENOUS at 20:44

## 2023-05-08 RX ADMIN — CEFAZOLIN SODIUM 2 G: 1 INJECTION, POWDER, FOR SOLUTION INTRAMUSCULAR; INTRAVENOUS at 11:40

## 2023-05-08 RX ADMIN — FENTANYL CITRATE 25 MCG: 50 INJECTION, SOLUTION INTRAMUSCULAR; INTRAVENOUS at 13:30

## 2023-05-08 RX ADMIN — AMLODIPINE BESYLATE 5 MG: 5 TABLET ORAL at 08:36

## 2023-05-08 RX ADMIN — HYDROMORPHONE HYDROCHLORIDE 0.5 MG: 1 INJECTION, SOLUTION INTRAMUSCULAR; INTRAVENOUS; SUBCUTANEOUS at 11:34

## 2023-05-08 RX ADMIN — HYDROMORPHONE HYDROCHLORIDE 0.5 MG: 1 INJECTION, SOLUTION INTRAMUSCULAR; INTRAVENOUS; SUBCUTANEOUS at 11:50

## 2023-05-08 RX ADMIN — SODIUM CHLORIDE: 9 INJECTION, SOLUTION INTRAVENOUS at 11:23

## 2023-05-08 RX ADMIN — LIDOCAINE HYDROCHLORIDE 60 MG: 20 INJECTION, SOLUTION EPIDURAL; INFILTRATION; INTRACAUDAL; PERINEURAL at 11:28

## 2023-05-08 RX ADMIN — FENTANYL CITRATE 50 MCG: 50 INJECTION INTRAMUSCULAR; INTRAVENOUS at 11:28

## 2023-05-08 ASSESSMENT — PAIN DESCRIPTION - ORIENTATION
ORIENTATION: RIGHT

## 2023-05-08 ASSESSMENT — PAIN DESCRIPTION - DESCRIPTORS
DESCRIPTORS: SORE
DESCRIPTORS: SHARP
DESCRIPTORS: SORE
DESCRIPTORS: ACHING

## 2023-05-08 ASSESSMENT — PAIN - FUNCTIONAL ASSESSMENT
PAIN_FUNCTIONAL_ASSESSMENT: PREVENTS OR INTERFERES SOME ACTIVE ACTIVITIES AND ADLS
PAIN_FUNCTIONAL_ASSESSMENT: 0-10
PAIN_FUNCTIONAL_ASSESSMENT: PREVENTS OR INTERFERES SOME ACTIVE ACTIVITIES AND ADLS

## 2023-05-08 ASSESSMENT — PAIN DESCRIPTION - PAIN TYPE
TYPE: SURGICAL PAIN
TYPE: SURGICAL PAIN

## 2023-05-08 ASSESSMENT — PAIN DESCRIPTION - FREQUENCY: FREQUENCY: CONTINUOUS

## 2023-05-08 ASSESSMENT — PAIN SCALES - GENERAL
PAINLEVEL_OUTOF10: 9
PAINLEVEL_OUTOF10: 6
PAINLEVEL_OUTOF10: 3
PAINLEVEL_OUTOF10: 4
PAINLEVEL_OUTOF10: 9

## 2023-05-08 ASSESSMENT — PAIN DESCRIPTION - LOCATION
LOCATION: ANKLE
LOCATION: ANKLE;LEG

## 2023-05-08 ASSESSMENT — PAIN DESCRIPTION - ONSET: ONSET: ON-GOING

## 2023-05-08 ASSESSMENT — LIFESTYLE VARIABLES: SMOKING_STATUS: 0

## 2023-05-08 NOTE — OP NOTE
Operative Note      Patient: Heraclio Quiroz  YOB: 1950  MRN: [id number]    Date of Procedure: 5/8/2023    Pre-Op Diagnosis Codes:     * Closed fracture of right ankle, initial encounter [M88.661Y]    Post-Op Diagnosis:  Right trimalleolar ankle fracture       Procedure(s):  OPEN REDUCTION INTERNAL FIXATION RIGHT ANKLE TRIMALLEOLAR FRACTURE    Surgeon(s):  Katie Philip MD    Assistant:   Surgical Assistant: Jovi Cesar    Anesthesia: General    Estimated Blood Loss (mL): less than 50     Complications: None    Specimens:   * No specimens in log *    Implants:  Implant Name Type Inv.  Item Serial No.  Lot No. LRB No. Used Action   PLATE BNE [id number] 4 H R DST LAT FIBULAR S STL ANNY COMPR FOR - [id number]  PLATE BNE [id number] 4 H R DST LAT FIBULAR S STL ANNY COMPR FOR  DEPUY [employer]WD  Right 1 Implanted   SCREW BNE L16MM DIA2.7MM HEATHER S STL ST ANNY FULL THRD T8 - [id number]  SCREW BNE L16MM DIA2.7MM HEATHER S STL ST ANNY FULL THRD T8  DEPUY [employer]-WD  Right 1 Implanted   SCREW BNE L14MM DIA2.7MM HEATHER S STL ST ANNY FULL THRD T8 - [id number]  SCREW BNE L14MM DIA2.7MM HEATHER S STL ST ANNY FULL THRD T8  DEPUY [employer]-WD  Right 2 Implanted   SCREW BNE L16MM DIA3.5MM HEATHER S STL ST NONCANNULATED ANNY - [id number]  SCREW BNE L16MM DIA3.5MM HEATHER S STL ST NONCANNULATED ANNY  DEPUY [employer] [place]-  Right 1 Implanted   SCREW BNE L12MM DIA3.5MM HEATHER S STL ST NONCANNULATED ANNY - [id number]  SCREW BNE L12MM DIA3.5MM HEATHER S STL ST NONCANNULATED ANNY  DEPUY [employer]NTH[employer] [place]-  Right 3 Implanted   SCREW BNE L40MM DIA4MM CANC S STL PARTIALLY THRD SM HEX - [id number]  SCREW BNE L40MM DIA4MM CANC S STL PARTIALLY THRD SM HEX  DEPUY [employer]NTH[employer] [place]-  Right 1 Implanted   SCREW BNE L35MM DIA4MM CANC S STL PARTIALLY THRD SM HEX - [id number]  SCREW BNE L35MM DIA4MM CANC S STL PARTIALLY THRD SM HEX  [employer]-WD  Right 1 Implanted   SCREW BNE L12MM DIA2.7MM HEATHER S STL ST ANNY FULL THRD T8 - [id number]  SCREW BNE

## 2023-05-08 NOTE — ANESTHESIA POSTPROCEDURE EVALUATION
Department of Anesthesiology  Postprocedure Note    Patient: Hillary Ferreira  MRN: [id number]  YOB: 1950  Date of evaluation: 5/8/2023      Procedure Summary     Date: 05/08/23 Room / Location: [address]    Anesthesia Start: 8093 Anesthesia Stop: 8059    Procedure: OPEN REDUCTION INTERNAL FIXATION RIGHT ANKLE TRIMALLEOLAR FRACTURE (Right: Ankle) Diagnosis:       Closed fracture of right ankle, initial encounter      (RIGHT ANKLE FRACTURE)    Surgeons: Eulah Brunner, MD Responsible Provider: John King MD    Anesthesia Type: general ASA Status: 3          Anesthesia Type: No value filed. Vahid Phase I: Vahid Score: 10    Vahid Phase II:        Anesthesia Post Evaluation    Patient location during evaluation: PACU  Patient participation: complete - patient participated  Level of consciousness: awake  Airway patency: patent  Nausea & Vomiting: no nausea and no vomiting  Cardiovascular status: blood pressure returned to baseline  Respiratory status: acceptable  Hydration status: stable  Comments: Vital signs stable  OK to discharge from Stage I post anesthesia care.   Care transferred from Anesthesiology department on discharge from perioperative area   Multimodal analgesia pain management approach

## 2023-05-08 NOTE — PLAN OF CARE
Problem: Discharge Planning  Goal: Discharge to home or other facility with appropriate resources  Outcome: Progressing  Flowsheets (Taken 5/7/2023 2207)  Discharge to home or other facility with appropriate resources: Identify barriers to discharge with patient and caregiver     Problem: Skin/Tissue Integrity  Goal: Absence of new skin breakdown  Description: 1. Monitor for areas of redness and/or skin breakdown  2. Assess vascular access sites hourly  3. Every 4-6 hours minimum:  Change oxygen saturation probe site  4. Every 4-6 hours:  If on nasal continuous positive airway pressure, respiratory therapy assess nares and determine need for appliance change or resting period.   Outcome: Progressing     Problem: Safety - Adult  Goal: Free from fall injury  Outcome: Progressing  Flowsheets (Taken 5/7/2023 2207)  Free From Fall Injury: Instruct family/caregiver on patient safety     Problem: ABCDS Injury Assessment  Goal: Absence of physical injury  Outcome: Progressing  Flowsheets (Taken 5/7/2023 2207)  Absence of Physical Injury: Implement safety measures based on patient assessment     Problem: Pain  Goal: Verbalizes/displays adequate comfort level or baseline comfort level  Outcome: Progressing  Flowsheets (Taken 5/7/2023 2207)  Verbalizes/displays adequate comfort level or baseline comfort level: Encourage patient to monitor pain and request assistance

## 2023-05-08 NOTE — H&P
The patient was interviewed and examined and there have been no changes since the documented History and Physical.  I have presented reasonable alternatives to the patient's proposed care, treatment and services. The discussion I have done encompassed risks, benefits and side effects related to the alternatives and the risks related to not receiving the proposed care, treatment and services.   Electronically signed by Concetta Koyanagi, MD on 5/8/2023 at 11:17 AM

## 2023-05-08 NOTE — ANESTHESIA PRE PROCEDURE
Department of Anesthesiology  Preprocedure Note       Name:  Lisette Hodges   Age:  71 y.o.  :  1950                                          MRN:  [id number]         Date:  2023      Surgeon: Mónica Aguilar):  Yolanda Laird MD    Procedure: Procedure(s):  OPEN REDUCTION INTERNAL FIXATION RIGHT ANKLE TRIMALLEOLAR FRACTURE    Medications prior to admission:   Prior to Admission medications    Medication Sig Start Date End Date Taking?  Authorizing Provider   folic acid (FOLVITE) 1 MG tablet Take 1 tablet by mouth daily    Historical Provider, MD   methotrexate (RHEUMATREX) 2.5 MG chemo tablet TAKE 8 TABLETS BY MOUTH ONCE A WEEK 22   Historical Provider, MD   aspirin 81 MG EC tablet Take 1 tablet by mouth daily    Historical Provider, MD   acetaminophen (TYLENOL) 500 MG tablet Take 2 tablets by mouth every 6 hours as needed    Historical Provider, MD   amLODIPine (NORVASC) 5 MG tablet Take 1 tablet by mouth daily 21   Historical Provider, MD   ergocalciferol (ERGOCALCIFEROL) 1.25 MG (80967 UT) capsule  22   Historical Provider, MD   ferrous sulfate (IRON 325) 325 (65 Fe) MG tablet Take 1 tablet by mouth daily (with breakfast) 21   Historical Provider, MD   glipiZIDE (GLUCOTROL XL) 5 MG extended release tablet Take 2 tablets by mouth daily (with breakfast) 21   Historical Provider, MD   hydroxychloroquine (PLAQUENIL) 200 MG tablet Take 1 tablet by mouth 2 times daily 10/5/21   Historical Provider, MD   lisinopril (PRINIVIL;ZESTRIL) 30 MG tablet Take 1 tablet by mouth daily 21   Historical Provider, MD   metFORMIN (GLUCOPHAGE-XR) 500 MG extended release tablet TAKE 2 TABLETS BY MOUTH TWICE DAILY 21   Historical Provider, MD   omeprazole (PRILOSEC) 20 MG delayed release capsule Take 1 capsule by mouth daily 21   Historical Provider, MD       Current medications:    Current Facility-Administered Medications   Medication Dose Route Frequency Provider Last Rate Last

## 2023-05-08 NOTE — CARE COORDINATION
[facility] only has semi-private rooms.      Electronically signed by PERLITA Nuno, LIZETTEW, Case Management on 5/8/2023 at 1:18 PM  [place] [phone]

## 2023-05-08 NOTE — CONSULTS
Consult Note  Physical Medicine and Rehabilitation    Patient: Immanuel Stoner  [id number]  Date: 5/8/2023      Chief Complaint: right ankle pain    History of Present Illness/Hospital Course:  Patient is a 70 yo F with pmh Rheumatoid arthritis, HTN, HLD, DM2, and prior right distal radius fracture (s/p ORIF 1/2023 and hardware removal 4/2023 with Abdirashid Aguero) who initially presented 5/5/2023 with right wrist and ankle pain after mechanical fall at home. She lost her balance and fell down 5-6 stairs (having to use modified techniques to descend stairs recently due to right wrist surgery and location of hand rail on the right). No head trauma or LOC reported. Found to have right trimalleolar ankle fracture. Underwent ORIF (5/8 with Dr. Naila Lawson). Now NWB RLE. Course complicated by hypokalemia, hypomagnesemia. Currently, patient reports severe right ankle pain. Also with discomfort in right knee across shoulders, and low back. Worse with movement. Improves with rest. She denies any tingling/numbness but does have pruritis over her face and buttocks. Denies any tongue swelling or difficulty breathing. She recognizes need for rehab prior to returning home.      Prior Level of Function:  Independent for mobility, ADLs -- having more difficulty since recent wrist surgeries    Current Level of Function:  Post-op therapy evals pending    Pertinent Social History:  Support: lives alone  Home set-up: House with steps to enter    Past Medical History:   Diagnosis Date    Arthritis     Reumitoid    COVID-19 12/2022    Diabetes mellitus ([facility] [address]     Fracture of radius and ulna 01/2023    near right wrist    Hyperlipidemia     Hypertension     Kidney stones     Rheumatoid arthritis ([address]        Past Surgical History:   Procedure Laterality Date    CORONARY ANGIOPLASTY WITH STENT PLACEMENT      FOREARM SURGERY Right 1/19/2023    OPEN REDUCTION INTERNAL FIXATION RIGHT DISTAL RADIUS FRACTURE performed by Josy Mayer MD at [address]

## 2023-05-09 ENCOUNTER — APPOINTMENT (OUTPATIENT)
Dept: GENERAL RADIOLOGY | Age: 73
DRG: 493 | End: 2023-05-09
Payer: MEDICARE

## 2023-05-09 VITALS
SYSTOLIC BLOOD PRESSURE: 171 MMHG | TEMPERATURE: 99 F | OXYGEN SATURATION: 94 % | WEIGHT: 226.63 LBS | HEART RATE: 80 BPM | HEIGHT: 62 IN | RESPIRATION RATE: 16 BRPM | BODY MASS INDEX: 41.71 KG/M2 | DIASTOLIC BLOOD PRESSURE: 76 MMHG

## 2023-05-09 LAB
ALBUMIN SERPL-MCNC: 3.2 G/DL (ref 3.4–5)
ALBUMIN/GLOB SERPL: 1.2 {RATIO} (ref 1.1–2.2)
ALP SERPL-CCNC: 70 U/L (ref 40–129)
ALT SERPL-CCNC: <5 U/L (ref 10–40)
ANION GAP SERPL CALCULATED.3IONS-SCNC: 12 MMOL/L (ref 3–16)
AST SERPL-CCNC: 8 U/L (ref 15–37)
BASOPHILS # BLD: 0 K/UL (ref 0–0.2)
BASOPHILS NFR BLD: 0.3 %
BILIRUB SERPL-MCNC: <0.2 MG/DL (ref 0–1)
BUN SERPL-MCNC: 17 MG/DL (ref 7–20)
CALCIUM SERPL-MCNC: 8.1 MG/DL (ref 8.3–10.6)
CHLORIDE SERPL-SCNC: 104 MMOL/L (ref 99–110)
CO2 SERPL-SCNC: 23 MMOL/L (ref 21–32)
CREAT SERPL-MCNC: 1.1 MG/DL (ref 0.6–1.2)
DEPRECATED RDW RBC AUTO: 17.2 % (ref 12.4–15.4)
EOSINOPHIL # BLD: 0 K/UL (ref 0–0.6)
EOSINOPHIL NFR BLD: 0.5 %
GFR SERPLBLD CREATININE-BSD FMLA CKD-EPI: 54 ML/MIN/{1.73_M2}
GLUCOSE BLD-MCNC: 147 MG/DL (ref 70–99)
GLUCOSE BLD-MCNC: 188 MG/DL (ref 70–99)
GLUCOSE BLD-MCNC: 230 MG/DL (ref 70–99)
GLUCOSE SERPL-MCNC: 155 MG/DL (ref 70–99)
HCT VFR BLD AUTO: 30.5 % (ref 36–48)
HGB BLD-MCNC: 9.7 G/DL (ref 12–16)
LYMPHOCYTES # BLD: 0.6 K/UL (ref 1–5.1)
LYMPHOCYTES NFR BLD: 9.3 %
MCH RBC QN AUTO: 28 PG (ref 26–34)
MCHC RBC AUTO-ENTMCNC: 31.8 G/DL (ref 31–36)
MCV RBC AUTO: 88.1 FL (ref 80–100)
MONOCYTES # BLD: 0.9 K/UL (ref 0–1.3)
MONOCYTES NFR BLD: 13.5 %
NEUTROPHILS # BLD: 5 K/UL (ref 1.7–7.7)
NEUTROPHILS NFR BLD: 76.4 %
PERFORMED ON: ABNORMAL
PLATELET # BLD AUTO: 220 K/UL (ref 135–450)
PMV BLD AUTO: 9.6 FL (ref 5–10.5)
POTASSIUM SERPL-SCNC: 4.3 MMOL/L (ref 3.5–5.1)
PROT SERPL-MCNC: 5.8 G/DL (ref 6.4–8.2)
RBC # BLD AUTO: 3.47 M/UL (ref 4–5.2)
SARS-COV-2 RDRP RESP QL NAA+PROBE: NOT DETECTED
SODIUM SERPL-SCNC: 139 MMOL/L (ref 136–145)
WBC # BLD AUTO: 6.5 K/UL (ref 4–11)

## 2023-05-09 PROCEDURE — 94760 N-INVAS EAR/PLS OXIMETRY 1: CPT

## 2023-05-09 PROCEDURE — 6370000000 HC RX 637 (ALT 250 FOR IP): Performed by: ORTHOPAEDIC SURGERY

## 2023-05-09 PROCEDURE — 36415 COLL VENOUS BLD VENIPUNCTURE: CPT

## 2023-05-09 PROCEDURE — 80053 COMPREHEN METABOLIC PANEL: CPT

## 2023-05-09 PROCEDURE — 85025 COMPLETE CBC W/AUTO DIFF WBC: CPT

## 2023-05-09 PROCEDURE — 2060000000 HC ICU INTERMEDIATE R&B

## 2023-05-09 PROCEDURE — 97166 OT EVAL MOD COMPLEX 45 MIN: CPT

## 2023-05-09 PROCEDURE — 73110 X-RAY EXAM OF WRIST: CPT

## 2023-05-09 PROCEDURE — 97110 THERAPEUTIC EXERCISES: CPT

## 2023-05-09 PROCEDURE — 97530 THERAPEUTIC ACTIVITIES: CPT

## 2023-05-09 PROCEDURE — 2580000003 HC RX 258: Performed by: ORTHOPAEDIC SURGERY

## 2023-05-09 PROCEDURE — 87635 SARS-COV-2 COVID-19 AMP PRB: CPT

## 2023-05-09 PROCEDURE — 97535 SELF CARE MNGMENT TRAINING: CPT

## 2023-05-09 PROCEDURE — 97162 PT EVAL MOD COMPLEX 30 MIN: CPT

## 2023-05-09 PROCEDURE — 6360000002 HC RX W HCPCS: Performed by: ORTHOPAEDIC SURGERY

## 2023-05-09 RX ORDER — ASPIRIN 81 MG/1
81 TABLET ORAL 2 TIMES DAILY
Qty: 60 TABLET | Refills: 0 | Status: SHIPPED | OUTPATIENT
Start: 2023-05-09 | End: 2023-06-08

## 2023-05-09 RX ORDER — OXYCODONE HYDROCHLORIDE 5 MG/1
5-10 TABLET ORAL EVERY 6 HOURS PRN
Qty: 40 TABLET | Refills: 0 | Status: SHIPPED | OUTPATIENT
Start: 2023-05-09 | End: 2023-05-09 | Stop reason: HOSPADM

## 2023-05-09 RX ORDER — OXYCODONE HYDROCHLORIDE 10 MG/1
10 TABLET ORAL EVERY 6 HOURS PRN
Qty: 28 TABLET | Refills: 0 | Status: SHIPPED | OUTPATIENT
Start: 2023-05-09 | End: 2023-05-16

## 2023-05-09 RX ADMIN — PANTOPRAZOLE SODIUM 40 MG: 40 TABLET, DELAYED RELEASE ORAL at 07:23

## 2023-05-09 RX ADMIN — AMLODIPINE BESYLATE 5 MG: 5 TABLET ORAL at 08:36

## 2023-05-09 RX ADMIN — HYDROXYCHLOROQUINE SULFATE 200 MG: 200 TABLET ORAL at 08:36

## 2023-05-09 RX ADMIN — CEFAZOLIN 2000 MG: 2 INJECTION, POWDER, FOR SOLUTION INTRAMUSCULAR; INTRAVENOUS at 04:41

## 2023-05-09 RX ADMIN — OXYCODONE 5 MG: 5 TABLET ORAL at 08:37

## 2023-05-09 RX ADMIN — OXYCODONE 5 MG: 5 TABLET ORAL at 18:50

## 2023-05-09 RX ADMIN — ENOXAPARIN SODIUM 30 MG: 100 INJECTION SUBCUTANEOUS at 08:36

## 2023-05-09 RX ADMIN — INSULIN LISPRO 1 UNITS: 100 INJECTION, SOLUTION INTRAVENOUS; SUBCUTANEOUS at 12:39

## 2023-05-09 RX ADMIN — OXYCODONE 5 MG: 5 TABLET ORAL at 00:55

## 2023-05-09 RX ADMIN — FOLIC ACID 1 MG: 1 TABLET ORAL at 08:37

## 2023-05-09 RX ADMIN — LISINOPRIL 30 MG: 20 TABLET ORAL at 08:36

## 2023-05-09 RX ADMIN — SODIUM CHLORIDE: 9 INJECTION, SOLUTION INTRAVENOUS at 00:56

## 2023-05-09 RX ADMIN — OXYCODONE 5 MG: 5 TABLET ORAL at 12:38

## 2023-05-09 ASSESSMENT — PAIN SCALES - GENERAL
PAINLEVEL_OUTOF10: 7
PAINLEVEL_OUTOF10: 5
PAINLEVEL_OUTOF10: 6
PAINLEVEL_OUTOF10: 4
PAINLEVEL_OUTOF10: 6
PAINLEVEL_OUTOF10: 6

## 2023-05-09 ASSESSMENT — PAIN DESCRIPTION - LOCATION: LOCATION: FOOT

## 2023-05-09 ASSESSMENT — PAIN DESCRIPTION - ORIENTATION: ORIENTATION: RIGHT

## 2023-05-09 NOTE — CARE COORDINATION
Reanna Authorization Received via [facility] Portal:  Plan Auth ID:  [id number]  Veronica Lackey ID:  [id number]  Service:  SNF  Approval Dates:  5/9//23-5/11/23  Next Review Date:  5/11/23        DISCHARGE SUMMARY     DATE OF DISCHARGE: 5/9/23    DISCHARGE DESTINATION: [address] [place] [address]    Level of Care: Skilled  Discharging to Facility/ Agency   Name: [facility]  Address:  Critical access hospital[facility] [address], [address] [place] [address]   Phone:  [phone]  Fax:  [phone]    Precert Obtained: yes    Hens Completed: yes  Notified: RN, Family, and Facility/Agency    TRANSPORTATION: [place] [address] Name: [employer] up Time: 7:30PM    Phone Number: [phone]    NEW DME ORDERED: N/A    Electronically signed by Jesika Rodriguez on 5/9/2023 at 4:22 PM  #[phone]

## 2023-05-09 NOTE — CARE COORDINATION
Followup w/ patient and her sister Frederick Peralta at bedside to discuss SNF options  She chooses [address]  Will need [facility] .   Electronically signed by Josie Royal on 5/9/2023 at 3:32 PM  #[phone]

## 2023-05-09 NOTE — DISCHARGE INSTR - COC
[facility]) Continuity of Care Form    Patient Name:  Tori Favre  : 1950    MRN:  [id number]    Admit date:  2023  Discharge date:  2023    Code Status Order: Full Code  Advance Directives: No    Admitting Physician: Jens Britton MD  PCP: Johanna Swenson     Discharging Nurse: Miguel Sainz RN   [address] Unit/Room#: U5C-5289/4361-60  Discharging Unit Phone Number: [phone]    Emergency Contact:        Past Surgical History:  Past Surgical History:   Procedure Laterality Date    ANKLE FRACTURE SURGERY Right 2023    OPEN REDUCTION INTERNAL FIXATION RIGHT ANKLE TRIMALLEOLAR FRACTURE performed by Irene Sicard, MD at [facility] Right 2023    OPEN REDUCTION INTERNAL FIXATION RIGHT DISTAL RADIUS FRACTURE performed by Ian Park MD at [address] [place] [address] Right 2023    REMOVAL OF HARDWARE-RIGHT HAND/WRIST/FOREARM-SYNTHES performed by Ian Park MD at [facility]       Immunization History:   Immunization History   Administered Date(s) Administered    COVID-19, PFIZER PURPLE top, DILUTE for use, (age 15 y+), 30mcg/0.3mL 2021, 2021       Active Problems:  Principal Problem:    Closed fracture of right ankle  Resolved Problems:    * No resolved hospital problems.  *      Isolation/Infection:       Nurse Assessment:  Last Vital Signs:BP (!) 143/66   Pulse 78   Temp 98.3 °F (36.8 °C) (Oral)   Resp 16   Ht 5' 2\" (1.575 m)   Wt 226 lb 10.1 oz (102.8 kg)   SpO2 (!) 88%   BMI 41.45 kg/m²   Last documented pain score (0-10 scale): Pain Level: 6  Last Weight:   Wt Readings from Last 1 Encounters:   23 226 lb 10.1 oz (102.8 kg)     Mental Status:  oriented and alert     IV Access:  - None    Nursing Mobility/ADLs:  Walking   Assisted  Transfer  Assisted  Bathing  Assisted  Dressing  Assisted  Toileting  Assisted  Feeding  Assisted  Med Admin  Independent  Med Delivery

## 2023-05-09 NOTE — CARE COORDINATION
05/09/23 1600   IMM Letter   IMM Letter given to Patient/Family/Significant other/Guardian/POA/by: explained to pt and copy provided per RONNIE Hurtado RN   IMM Letter date given: 05/09/23   IMM Letter time given: 1519     Education provided to patient. Patient reported no questions and verbalized understanding. Patient made aware that he/she has 4 hours prior to discharging from hospital to decide if he/she wishes to pursue the Medicare appeal process.       Electronically signed by Rosa Mcclelland on 5/9/2023 at 4:00 PM  #[phone]

## 2023-05-09 NOTE — CARE COORDINATION
[address] [facility] ACCESS PORTAL    REQUESTED SETTING:  skilled at [address]:  5/9/2023    Electronically signed by Rosa Mcclelland on 5/9/2023 at 3:58 PM  #[phone]

## 2023-05-09 NOTE — ACP (ADVANCE CARE PLANNING)
follow-up conversation to continue planning  [] Referred individual to Provider for additional questions/concerns   [] Advised patient/agent/surrogate to review completed ACP document and update if needed with changes in condition, patient preferences or care setting    [] This note routed to one or more involved healthcare providers      Electronically signed by Renata Richard on 5/9/2023 at 10:17 AM  #[phone]

## 2023-05-09 NOTE — PLAN OF CARE
Problem: Discharge Planning  Goal: Discharge to home or other facility with appropriate resources  Outcome: Progressing  Flowsheets (Taken 5/9/2023 0330)  Discharge to home or other facility with appropriate resources: Identify barriers to discharge with patient and caregiver     Problem: Skin/Tissue Integrity  Goal: Absence of new skin breakdown  Description: 1. Monitor for areas of redness and/or skin breakdown  2. Assess vascular access sites hourly  3. Every 4-6 hours minimum:  Change oxygen saturation probe site  4. Every 4-6 hours:  If on nasal continuous positive airway pressure, respiratory therapy assess nares and determine need for appliance change or resting period.   Outcome: Progressing     Problem: Safety - Adult  Goal: Free from fall injury  Outcome: Progressing  Flowsheets (Taken 5/9/2023 0330)  Free From Fall Injury: Instruct family/caregiver on patient safety     Problem: ABCDS Injury Assessment  Goal: Absence of physical injury  Outcome: Progressing  Flowsheets (Taken 5/9/2023 0330)  Absence of Physical Injury: Implement safety measures based on patient assessment     Problem: Pain  Goal: Verbalizes/displays adequate comfort level or baseline comfort level  Outcome: Progressing  Flowsheets (Taken 5/9/2023 0330)  Verbalizes/displays adequate comfort level or baseline comfort level:   Encourage patient to monitor pain and request assistance   Assess pain using appropriate pain scale   Administer analgesics based on type and severity of pain and evaluate response     Problem: Chronic Conditions and Co-morbidities  Goal: Patient's chronic conditions and co-morbidity symptoms are monitored and maintained or improved  Outcome: Progressing

## 2023-05-10 NOTE — PROGRESS NOTES
ARU referral received. Appears patient going to SNF at [place]. ARU will continue to follow.
Attempted to call report to [facility] at [phone] , but there was no answer.
[facility] Orthopedic Surgery   Progress Note      S/P :  SUBJECTIVE  in bed. Alert and oriented. . Pain is   described in right ankle and with the intensity of moderate to severe. Pain is described as aching, throbbing. Pain is worse with movement of right foot. Also persistent right wrist pain/tender and aching with activity. Post removal right wrist HDW 2 weeks ago per DR Jenny Barahona. OBJECTIVE              Physical                      VITALS:  BP (!) 147/83   Pulse 82   Temp 98.7 °F (37.1 °C) (Oral)   Resp 18   Ht 5' 2\" (1.575 m)   Wt 226 lb 10.1 oz (102.8 kg)   SpO2 94%   BMI 41.45 kg/m²                     MUSCULOSKELETAL:  Right wrist with intact flex/ext mildly limited by pain and hand grasp and extension and thumbs up . Right foot pale pink. Slow cap refill noted and unable to palpate DP pulse under splinting right foot. I removed the ACE only and loosened cotton roll top of right foot. After 2-3 min there was a more pink color to right toes and I was able to palpate 1+ pedal pulse at DP. REwrapped ACE over splint.right foot NVI. Wiggles toes to command. Able to plantarflex and dorsiflex ankle Pedal pulses are palpable. NEUROLOGIC:                                  Sensory:  Touch:  Right Lower Extremity:  normal                                                 Surgical wound appears clean and dry right ankle with splint and ACE. Foot elevated on pillows.      Data       CBC:   Lab Results   Component Value Date/Time    WBC 6.5 05/09/2023 05:29 AM    RBC 3.47 05/09/2023 05:29 AM    HGB 9.7 05/09/2023 05:29 AM    HCT 30.5 05/09/2023 05:29 AM    MCV 88.1 05/09/2023 05:29 AM    MCH 28.0 05/09/2023 05:29 AM    MCHC 31.8 05/09/2023 05:29 AM    RDW 17.2 05/09/2023 05:29 AM     05/09/2023 05:29 AM    MPV 9.6 05/09/2023 05:29 AM        WBC:    Lab Results   Component Value Date/Time    WBC 6.5 05/09/2023 05:29 AM        Hemoglobin/Hematocrit:    Lab Results   Component Value Date/Time
Occupational Therapy  Facility/Department: [facility]  Occupational Therapy Initial Assessment    Name: Domo Osman  : 1950  MRN: [id number]  Date of Service: 2023    Discharge Recommendations:  3-5 sessions per week, Patient would benefit from continued therapy after discharge        Domo Osman scored a 14/ on the AM-PAC ADL Inpatient form. Current research shows that an AM-PAC score of 17 or less is typically not associated with a discharge to the patient's home setting. Based on the patient's AM-PAC score and their current ADL deficits, it is recommended that the patient have 3-5 sessions per week of Occupational Therapy at d/c to increase the patient's independence. Please see assessment section for further patient specific details. If patient discharges prior to next session this note will serve as a discharge summary. Please see below for the latest assessment towards goals. Patient Diagnosis(es): The encounter diagnosis was Closed fracture of right ankle, initial encounter. Past Medical History:  has a past medical history of Arthritis, COVID-19, Diabetes mellitus ([address] Fracture of radius and ulna, Hyperlipidemia, Hypertension, Kidney stones, and Rheumatoid arthritis ([address] Past Surgical History:  has a past surgical history that includes Coronary angioplasty with stent; Forearm surgery (Right, 2023); Wrist surgery (Right, 2023); and Ankle fracture surgery (Right, 2023). Treatment Diagnosis: impaired ADL/fxl mobility    Assessment   Performance deficits / Impairments: Decreased functional mobility ; Decreased balance;Decreased ADL status; Decreased high-level IADLs;Decreased endurance;Decreased fine motor control;Decreased ROM  Assessment: 70 yo female admitted after a fall resulting in R ankle fx. s/p  R ankle ORIF now NWB. Additionally pt with R wrist radius fx repaired 2023 and 23 hardware removal now WBAT. PMH: DM, HTN, BLE knee OA.
Patient continues to rest in bed this am, no acute events, remains NPO for surgery today, all needs met.
Patient discharged with belongings and durable medical equipment with family via stretcher via [facility] transportation to private residence/facility name. Gave report to name at facility. Patient given discharge instructions, patient verbalized understanding, no questions at this time. Prescriptions given to patient/family. IV D/Cd patient tolerated well, no complications.
Patient resting in bed upon assessment, has no complaints, vital signs stable. Ace wraps to right wrist and right ankle clean, dry and intact, elevated on pillows. Shaye Wendi in place, all needs met at this time.
Physical Therapy        Facility/Department: [facility]      Physical Therapy Initial Assessment      Name: Mike Wang    : 1950    MRN: [id number]    Date of Service: 2023    Assessment / Discharge Recommendations:    -right ankle fracture  ORIF  NWB      -will need continued PTOT and nursing care in a skilled setting - recommend a slow pace   -left knee with limited ROM and arthritic   -right wrist with recent fracture and ORIF    Mike Wang scored a  on the AM-PAC short mobility form. Current research shows that an AM-PAC score of 17 or less is typically not associated with a discharge to the patient's home setting. Based on the patient's AM-PAC score and their current functional mobility deficits, it is recommended that the patient have 3-5 sessions per week of Physical Therapy at d/c to increase the patient's independence. Patient Diagnosis(es): The encounter diagnosis was Closed fracture of right ankle, initial encounter. Past Medical History:  has a past medical history of Arthritis, COVID-19, Diabetes mellitus ([address] Fracture of radius and ulna, Hyperlipidemia, Hypertension, Kidney stones, and Rheumatoid arthritis ([address] Past Surgical History:  has a past surgical history that includes Coronary angioplasty with stent; Forearm surgery (Right, 2023); Wrist surgery (Right, 2023); and Ankle fracture surgery (Right, 2023). Body Structures, Functions, Activity Limitations Requiring Skilled Therapeutic Intervention: Decreased functional mobility ; Decreased ADL status; Decreased balance; Increased pain;Decreased ROM  Therapy Prognosis: Good  Decision Making: Medium Complexity  Requires PT Follow-Up: Yes  Activity Tolerance  Activity Tolerance: Patient tolerated treatment well;Patient limited by pain     Plan   Physcial Therapy Plan  General Plan: 3-5 times per week  Current Treatment Recommendations: Transfer training, ADL/Self-care training,
Pt received back from OR . She is on room air her right ankle dressing is clean , dry, and intact .
Pt. Assisted from chair to Bathroom with 2 assist and Rima Silva PT she was placed on the [facility] pt voided and then was assisted back to bed pt. Tolerated fair but had a tremendous amount of pain pt. Was situated in the bed for comfort and RLE was up on 2 pillows and ice was applied.
Report called to Dilan Chang at [address] [place] [address].
Right arm sutures removed and steri stripes placed.
The patient is postoperative day #1 status post open reduction and internal fixation of right trimalleolar ankle fracture. Her pain is well controlled. The patient does have issues with her right upper extremity which will affect her recovery. She most definitely will benefit from a stay at the inpatient rehab unit here at [facility].  She is neurovascularly intact in the right lower extremity. Her splint is intact. The patient may be transferred to the inpatient rehab unit today as long as this is approved. The sutures may be removed from the right wrist and Steri-Strips applied on Thursday. The patient needs to follow-up with me in my office in 10 days.
The patients OARRS report has been reviewed online and any prescribing of pain related medications is based on our findings. The patient has been issued narcotics to safely reduce postoperative pain and promote tolerance with physical therapies and ADL's. Reduction in dosing will be addressed with the next narcotic refill request. Dosing is adjusted for patients with history of chronic pain disorders.
To OR per bed.
05/08/2023 04:36 AM    MPV 10.4 05/08/2023 04:36 AM        WBC:    Lab Results   Component Value Date/Time    WBC 6.0 05/08/2023 04:36 AM        Hemoglobin/Hematocrit:    Lab Results   Component Value Date/Time    HGB 10.8 05/08/2023 04:36 AM    HCT 33.7 05/08/2023 04:36 AM        PT/INR:    Lab Results   Component Value Date/Time    PROTIME 12.7 05/05/2023 08:35 PM    INR 0.95 05/05/2023 08:35 PM              Current Inpatient Medications             Current Facility-Administered Medications: calcium carbonate (TUMS) chewable tablet 500 mg, 500 mg, Oral, TID PRN  amLODIPine (NORVASC) tablet 5 mg, 5 mg, Oral, Daily  ferrous sulfate EC tablet 324 mg, 324 mg, Oral, Daily with breakfast  folic acid (FOLVITE) tablet 1 mg, 1 mg, Oral, Daily  hydroxychloroquine (PLAQUENIL) tablet 200 mg, 200 mg, Oral, BID  lisinopril (PRINIVIL;ZESTRIL) tablet 30 mg, 30 mg, Oral, Daily  pantoprazole (PROTONIX) tablet 40 mg, 40 mg, Oral, QAM AC  glucose chewable tablet 16 g, 4 tablet, Oral, PRN  dextrose bolus 10% 125 mL, 125 mL, IntraVENous, PRN **OR** dextrose bolus 10% 250 mL, 250 mL, IntraVENous, PRN  glucagon (rDNA) injection 1 mg, 1 mg, SubCUTAneous, PRN  dextrose 10 % infusion, , IntraVENous, Continuous PRN  insulin lispro (HUMALOG) injection vial 0-4 Units, 0-4 Units, SubCUTAneous, TID WC  insulin lispro (HUMALOG) injection vial 0-4 Units, 0-4 Units, SubCUTAneous, Nightly  oxyCODONE (ROXICODONE) immediate release tablet 5 mg, 5 mg, Oral, Q4H PRN **OR** oxyCODONE HCl (OXY-IR) immediate release tablet 10 mg, 10 mg, Oral, Q4H PRN  sodium chloride flush 0.9 % injection 5-40 mL, 5-40 mL, IntraVENous, 2 times per day  sodium chloride flush 0.9 % injection 5-40 mL, 5-40 mL, IntraVENous, PRN  0.9 % sodium chloride infusion, , IntraVENous, PRN  enoxaparin Sodium (LOVENOX) injection 30 mg, 30 mg, SubCUTAneous, BID  ondansetron (ZOFRAN-ODT) disintegrating tablet 4 mg, 4 mg, Oral, Q8H PRN **OR** ondansetron (ZOFRAN) injection 4 mg, 4 mg,
99 - 110 mmol/L    CO2 23 21 - 32 mmol/L    Anion Gap 12 3 - 16    Glucose 155 (H) 70 - 99 mg/dL    BUN 17 7 - 20 mg/dL    Creatinine 1.1 0.6 - 1.2 mg/dL    Est, Glom Filt Rate 54 (A) >60    Calcium 8.1 (L) 8.3 - 10.6 mg/dL    Total Protein 5.8 (L) 6.4 - 8.2 g/dL    Albumin 3.2 (L) 3.4 - 5.0 g/dL    Albumin/Globulin Ratio 1.2 1.1 - 2.2    Total Bilirubin <0.2 0.0 - 1.0 mg/dL    Alkaline Phosphatase 70 40 - 129 U/L    ALT <5 (L) 10 - 40 U/L    AST 8 (L) 15 - 37 U/L   CBC with Auto Differential    Collection Time: 05/09/23  5:29 AM   Result Value Ref Range    WBC 6.5 4.0 - 11.0 K/uL    RBC 3.47 (L) 4.00 - 5.20 M/uL    Hemoglobin 9.7 (L) 12.0 - 16.0 g/dL    Hematocrit 30.5 (L) 36.0 - 48.0 %    MCV 88.1 80.0 - 100.0 fL    MCH 28.0 26.0 - 34.0 pg    MCHC 31.8 31.0 - 36.0 g/dL    RDW 17.2 (H) 12.4 - 15.4 %    Platelets 121 153 - 116 K/uL    MPV 9.6 5.0 - 10.5 fL    Neutrophils % 76.4 %    Lymphocytes % 9.3 %    Monocytes % 13.5 %    Eosinophils % 0.5 %    Basophils % 0.3 %    Neutrophils Absolute 5.0 1.7 - 7.7 K/uL    Lymphocytes Absolute 0.6 (L) 1.0 - 5.1 K/uL    Monocytes Absolute 0.9 0.0 - 1.3 K/uL    Eosinophils Absolute 0.0 0.0 - 0.6 K/uL    Basophils Absolute 0.0 0.0 - 0.2 K/uL   POCT Glucose    Collection Time: 05/09/23  6:12 AM   Result Value Ref Range    POC Glucose 147 (H) 70 - 99 mg/dl    Performed on ACCU-CHEK    POCT Glucose    Collection Time: 05/09/23 11:54 AM   Result Value Ref Range    POC Glucose 230 (H) 70 - 99 mg/dl    Performed on ACCU-CHEK        Therapy progress:  Physical therapy:  Bed Mobility:     Sit>supine:     Supine>sit:     Transfers:     Sit>stand:     Stand>sit:     Bed<>chair     Stand Pivot:     Lateral transfer:     Car transfer:     Ambulation:     Stairs:     Curb:      Wheelchair:     Assessment:  Activity Tolerance: Patient tolerated treatment well, Patient limited by pain      Occupational therapy:   Feeding     Grooming/Oral Hygiene     UE Bathing     LE Bathing     UE Dressing
PRN  sodium chloride, , PRN  HYDROmorphone, 0.25 mg, Q3H PRN   Or  HYDROmorphone, 0.5 mg, Q3H PRN  oxyCODONE, 5 mg, Q4H PRN   Or  oxyCODONE, 10 mg, Q4H PRN  calcium carbonate, 500 mg, TID PRN  glucose, 4 tablet, PRN  dextrose bolus, 125 mL, PRN   Or  dextrose bolus, 250 mL, PRN  glucagon (rDNA), 1 mg, PRN  dextrose, , Continuous PRN  sodium chloride, , PRN  ondansetron, 4 mg, Q8H PRN   Or  ondansetron, 4 mg, Q6H PRN  polyethylene glycol, 17 g, Daily PRN  acetaminophen, 650 mg, Q6H PRN   Or  acetaminophen, 650 mg, Q6H PRN        Labs and Imaging   XR ANKLE RIGHT (MIN 3 VIEWS)    Result Date: 5/5/2023  EXAMINATION: THREE XRAY VIEWS OF THE RIGHT ANKLE 5/5/2023 4:04 pm COMPARISON: None. HISTORY: ORDERING SYSTEM PROVIDED HISTORY: fall- right ankle pain TECHNOLOGIST PROVIDED HISTORY: Reason for exam:->fall- right ankle pain Reason for Exam: Ankle Pain Relevant Medical/Surgical History: RA, OA FINDINGS: Nondisplaced fracture of the distal fibula at the level of the syndesmosis. Nondisplaced medial malleolar fracture. Posterior malleolar fracture. No evidence of dislocation     Trimalleolar ankle fracture. CBC:   Recent Labs     05/06/23 0521 05/07/23 0451 05/08/23  0436   WBC 5.4 5.6 6.0   HGB 10.4* 10.5* 10.8*    189 196       BMP:    Recent Labs     05/06/23  0521 05/07/23 0451 05/08/23  0436    138 138   K 3.3* 3.2* 4.1    100 101   CO2 28 28 27   BUN 17 13 14   CREATININE 0.7 0.9 0.9   GLUCOSE 156* 174* 159*       Hepatic:   Recent Labs     05/07/23 0451 05/08/23  0436   AST 16 11*   ALT <5* 6*   BILITOT 0.5 0.5   ALKPHOS 72 78       Lipids: No results found for: CHOL, HDL, TRIG  Hemoglobin A1C:   Lab Results   Component Value Date/Time    LABA1C 6.8 05/06/2023 05:21 AM     TSH: No results found for: TSH  Troponin: No results found for: TROPONINT  Lactic Acid: No results for input(s): LACTA in the last 72 hours. BNP: No results for input(s): PROBNP in the last 72 hours.   UA:No results

## 2023-05-12 NOTE — DISCHARGE SUMMARY
Hospital Medicine Discharge Summary      Patient ID: Deniz Carrington [id number]     Patient's PCP: Alma Shearer     Admit Date: 5/5/2023     Discharge Date: 5/9/2023      Admitting Physician: Chun Guerra MD    Discharge Physician: Radha Sol MD     Discharge Diagnoses: Active Hospital Problems    Diagnosis Date Noted    Closed fracture of right ankle [S82.891A] 05/05/2023         The patient was seen and examined on the day of discharge and this discharge summary is in conjunction with any daily progress note from day of discharge. HOSPITAL COURSE    Patient demographics:  The patient  Lucy Arevalo is a 71 y.o. female     Significant past medical history:   Patient Active Problem List   Diagnosis    Closed fracture of right distal radius    Closed fracture of right ankle         Presenting symptoms:  right foot pain    Diagnostic workup:  XR WRIST RIGHT  XR ANKLE RIGHT      CONSULTS DURING ADMISSION :   IP CONSULT TO SOCIAL WORK  IP CONSULT TO PHYSICAL MEDICINE REHAB      Patient was diagnosed with:  Right closed traumatic right trimalleolar fracture  Hypokalemia  Hypomagnesemia  DM  HTN      Treatment while inpatient:  Lucy Arevalo is a 71 y.o.  female  who presents with PMHx   Patient presented to [address] ED via EMS today after mechanical fall on her staircase at home. Labs:  For convenience and continuity at follow-up the following most recent labs are provided:      CBC:   Lab Results   Component Value Date/Time    WBC 6.5 05/09/2023 05:29 AM    HGB 9.7 05/09/2023 05:29 AM    HCT 30.5 05/09/2023 05:29 AM     05/09/2023 05:29 AM       RENAL:   Lab Results   Component Value Date/Time     05/09/2023 05:29 AM    K 4.3 05/09/2023 05:29 AM     05/09/2023 05:29 AM    CO2 23 05/09/2023 05:29 AM    BUN 17 05/09/2023 05:29 AM    CREATININE 1.1 05/09/2023 05:29 AM           Discharge Medications:      Medication List        START taking these

## 2023-05-23 ENCOUNTER — OFFICE VISIT (OUTPATIENT)
Dept: ORTHOPEDIC SURGERY | Age: 73
End: 2023-05-23

## 2023-05-23 VITALS — HEIGHT: 63 IN | WEIGHT: 215 LBS | BODY MASS INDEX: 38.09 KG/M2

## 2023-05-23 DIAGNOSIS — Z87.81 S/P ORIF (OPEN REDUCTION INTERNAL FIXATION) FRACTURE: Primary | ICD-10-CM

## 2023-05-23 DIAGNOSIS — Z98.890 S/P ORIF (OPEN REDUCTION INTERNAL FIXATION) FRACTURE: Primary | ICD-10-CM

## 2023-05-23 NOTE — PROGRESS NOTES
Bertha Devries  [id number]  May 23, 2023    Chief Complaint   Patient presents with    Follow-up     Post Op ORIF  rt ankle  DOS 5/8/23       History: The patient is a 80-year-old female who is here in follow-up regarding her right ankle. She sustained a right trimalleolar ankle fracture and underwent surgery on 5/8. She has been recovering from a wrist injury that she sustained back in December. She had some hardware removed in April. She currently is recovering at [facility]. Her pain in the right ankle is mild to moderate. Ht 5' 3\" (1.6 m)   Wt 215 lb (97.5 kg)   BMI 38.09 kg/m²     Physical:Ms. Bertha Devries appears well, she is in no apparent distress, she demonstrates appropriate mood & affect. She has mild swelling. There is No evidence of DVT seen on physical exam. She is neurovascularly intact distally. Range of motion is from 10 degrees of dorsiflexion to 15 degrees of plantarflexion. The incisions are clean, dry and intact and without erythema. X-rays: 3 views of the right ankle obtained and reviewed demonstrate excellent alignment. The mortise is intact. The hardware is in good position. The fractures are healing well. Impression: status post open reduction and internal fixation of right ankle    Plan: At this time we will immobilize the right ankle in a tall boot. The patient may be 50% weightbearing with the boot for the next week as tolerated. If she tolerates the 50% weightbearing, she may progress to weightbearing as tolerated in 1 week. She will need to work on steps/stairs as she does have numerous steps in her home. The patient may remove the boot when she is not walking. She may begin to work on active and passive range of motion of the right ankle as tolerated. She will follow-up with me in approximately 4 weeks and we will reassess her then. At follow-up, 3 views of the right ankle will be obtained.     Orders Placed This Encounter

## 2023-06-01 ENCOUNTER — OFFICE VISIT (OUTPATIENT)
Dept: ORTHOPEDIC SURGERY | Age: 73
End: 2023-06-01

## 2023-06-01 ENCOUNTER — TELEPHONE (OUTPATIENT)
Dept: ORTHOPEDIC SURGERY | Age: 73
End: 2023-06-01

## 2023-06-01 VITALS — HEIGHT: 63 IN | RESPIRATION RATE: 16 BRPM | WEIGHT: 215 LBS | BODY MASS INDEX: 38.09 KG/M2

## 2023-06-01 DIAGNOSIS — S52.571D OTHER CLOSED INTRA-ARTICULAR FRACTURE OF DISTAL END OF RIGHT RADIUS WITH ROUTINE HEALING, SUBSEQUENT ENCOUNTER: ICD-10-CM

## 2023-06-01 DIAGNOSIS — Z98.890 STATUS POST HARDWARE REMOVAL: Primary | ICD-10-CM

## 2023-06-01 PROCEDURE — 99024 POSTOP FOLLOW-UP VISIT: CPT | Performed by: ORTHOPAEDIC SURGERY

## 2023-06-01 NOTE — TELEPHONE ENCOUNTER
Elvira Michael, , [facility], [phone]  Ext [phone]  Patient was in earlier today. Patient returned to facility - consult form was not filled in. Elvira Michael is asking if there are any orders? Changes in weight bearing on right wrist? Any new notes please fax to [phone].

## 2023-06-01 NOTE — PROGRESS NOTES
[facility] PROGRESS NOTE    Chief Complaint   Patient presents with    Post-Op Check     Right wrist          HPI  6/1/23  Mel Lewis returns to clinic today for follow-up of her right wrist  She underwent dorsal bridge plate removal on April 24  Since her wrist surgery, she had another fall and did sustain a right ankle fracture which was fixed by Dr. Ivonne Ewing  Is currently at a [facility]  She reports she is working on finger and wrist range of motion exercises  No incisional issues  She describes some sensitivity of the dorsal right hand skin      4/24/23  OPERATION PERFORMED:  Removal of the right hand/wrist/forearm deep implant (dorsal bridge plate and eight screws).       4/13/23  Patient returns to clinic today for follow-up of her right wrist  She is almost 3 months postop  She is in therapy  They are working on finger range of motion  She reports pain and swelling have improved  She still has some burning and tingling in the fingertips, not numb however      3/16/23  eMl Lewis returns to clinic today early for questions in regards to her right wrist  She reports the wrist brace rubs against the dorsal hand, causes irritation  She wants to make sure there is no skin issues or breakdown  She has not gotten the right hand/wrist/forearm region wet in the shower yet  She is working on finger range of motion herself, she did not start hand therapy yet as ordered at her last appointment  Denies N/T today      3/2/23  Second postop check right distal radius ORIF, including with bridge plate  Also follow-up of left fifth metacarpal base fracture treated nonoperatively  She denies issues with the left hand  Does have some intermittent aches over the ulnar aspect of the left hand  She describes stiffness in her right hand/fingers  Improving swelling, but persists  She does describe some diffuse numbness and tingling in all 5 digits      1/19/23  OPERATION PERFORMED:  Open treatment of the right intraarticular distal  radius fracture with

## 2023-06-20 ENCOUNTER — OFFICE VISIT (OUTPATIENT)
Dept: ORTHOPEDIC SURGERY | Age: 73
End: 2023-06-20

## 2023-06-20 VITALS — WEIGHT: 215 LBS | BODY MASS INDEX: 38.09 KG/M2 | HEIGHT: 63 IN

## 2023-06-20 DIAGNOSIS — Z87.81 S/P ORIF (OPEN REDUCTION INTERNAL FIXATION) FRACTURE: Primary | ICD-10-CM

## 2023-06-20 DIAGNOSIS — Z98.890 S/P ORIF (OPEN REDUCTION INTERNAL FIXATION) FRACTURE: Primary | ICD-10-CM

## 2023-06-20 PROCEDURE — 99024 POSTOP FOLLOW-UP VISIT: CPT | Performed by: ORTHOPAEDIC SURGERY

## 2023-06-20 NOTE — PROGRESS NOTES
Lucy Arevalo  [id number]  June 20, 2023    Chief Complaint   Patient presents with    Post-Op Check     5/8/23 ORIF R ankle        History: The patient is a 69-year-old female who is here in follow-up regarding her right ankle. She sustained a right trimalleolar ankle fracture and underwent surgery on 5/8. She is recovering at her sister's home. She has been weightbearing as tolerated in the boot. She has minimal pain in the right ankle. Ht 5' 3\" (1.6 m)   Wt 215 lb (97.5 kg)   BMI 38.09 kg/m²     Physical:Ms. Lucy Arevalo appears well, she is in no apparent distress, she demonstrates appropriate mood & affect. She has mild swelling. There is No evidence of DVT seen on physical exam. She is neurovascularly intact distally. Range of motion is from 15 degrees of dorsiflexion to 20 degrees of plantarflexion. The incisions are clean, dry and intact and without erythema. X-rays: 3 views of the right ankle obtained and reviewed demonstrate excellent alignment. The mortise is intact. The hardware is in good position. The fractures are healing well. Impression: status post open reduction and internal fixation of right ankle    Plan: At this time, the patient needs to work aggressively on active and passive range of motion to the right ankle. She may weight-bear as tolerated with the boot for 2 more weeks. She will then begin to weight-bear as tolerated without the boot. The patient can follow-up with me in 4 weeks and we will reassess her then. At follow-up, 3 views of the right ankle will be obtained. The patient would like to participate in home therapy for the next few weeks.         Orders Placed This Encounter   Procedures    XR ANKLE RIGHT (MIN 3 VIEWS)     Standing Status:   Future     Number of Occurrences:   1     Standing Expiration Date:   6/20/2024     Scheduling Instructions:      Rm 22     Order Specific Question:   Reason for exam:     Answer:   pain

## 2023-07-27 ENCOUNTER — OFFICE VISIT (OUTPATIENT)
Dept: ORTHOPEDIC SURGERY | Age: 73
End: 2023-07-27

## 2023-07-27 VITALS — WEIGHT: 209 LBS | HEIGHT: 63 IN | BODY MASS INDEX: 37.03 KG/M2

## 2023-07-27 DIAGNOSIS — Z98.890 S/P ORIF (OPEN REDUCTION INTERNAL FIXATION) FRACTURE: Primary | ICD-10-CM

## 2023-07-27 DIAGNOSIS — Z87.81 S/P ORIF (OPEN REDUCTION INTERNAL FIXATION) FRACTURE: Primary | ICD-10-CM

## 2023-12-07 ENCOUNTER — OFFICE VISIT (OUTPATIENT)
Dept: ORTHOPEDIC SURGERY | Age: 73
End: 2023-12-07
Payer: MEDICARE

## 2023-12-07 VITALS — WEIGHT: 209 LBS | BODY MASS INDEX: 37.03 KG/M2 | HEIGHT: 63 IN

## 2023-12-07 DIAGNOSIS — S52.571D OTHER CLOSED INTRA-ARTICULAR FRACTURE OF DISTAL END OF RIGHT RADIUS WITH ROUTINE HEALING, SUBSEQUENT ENCOUNTER: Primary | ICD-10-CM

## 2023-12-07 PROCEDURE — 1124F ACP DISCUSS-NO DSCNMKR DOCD: CPT | Performed by: ORTHOPAEDIC SURGERY

## 2023-12-07 PROCEDURE — 99213 OFFICE O/P EST LOW 20 MIN: CPT | Performed by: ORTHOPAEDIC SURGERY

## 2024-02-12 ENCOUNTER — HOSPITAL ENCOUNTER (OUTPATIENT)
Dept: WOUND CARE | Age: 74
Discharge: HOME | End: 2024-02-12
Attending: NURSE PRACTITIONER
Payer: MEDICARE

## 2024-02-12 VITALS
BODY MASS INDEX: 40.39 KG/M2 | HEIGHT: 63 IN | TEMPERATURE: 97.3 F | RESPIRATION RATE: 18 BRPM | SYSTOLIC BLOOD PRESSURE: 122 MMHG | WEIGHT: 227.96 LBS | DIASTOLIC BLOOD PRESSURE: 58 MMHG

## 2024-02-12 DIAGNOSIS — S71.101A OPEN WOUND OF RIGHT THIGH, INITIAL ENCOUNTER: Primary | ICD-10-CM

## 2024-02-12 PROCEDURE — 11042 DBRDMT SUBQ TIS 1ST 20SQCM/<: CPT

## 2024-02-12 PROCEDURE — 11042 DBRDMT SUBQ TIS 1ST 20SQCM/<: CPT | Performed by: NURSE PRACTITIONER

## 2024-02-12 PROCEDURE — 99214 OFFICE O/P EST MOD 30 MIN: CPT

## 2024-02-12 RX ORDER — LIDOCAINE 50 MG/G
OINTMENT TOPICAL ONCE
OUTPATIENT
Start: 2024-02-12 | End: 2024-02-12

## 2024-02-12 RX ORDER — GENTAMICIN SULFATE 1 MG/G
OINTMENT TOPICAL ONCE
OUTPATIENT
Start: 2024-02-12 | End: 2024-02-12

## 2024-02-12 RX ORDER — FAMOTIDINE 20 MG/1
20 TABLET, FILM COATED ORAL 2 TIMES DAILY
COMMUNITY

## 2024-02-12 RX ORDER — SODIUM CHLOR/HYPOCHLOROUS ACID 0.033 %
SOLUTION, IRRIGATION IRRIGATION ONCE
OUTPATIENT
Start: 2024-02-12 | End: 2024-02-12

## 2024-02-12 RX ORDER — BACITRACIN ZINC AND POLYMYXIN B SULFATE 500; 1000 [USP'U]/G; [USP'U]/G
OINTMENT TOPICAL ONCE
OUTPATIENT
Start: 2024-02-12 | End: 2024-02-12

## 2024-02-12 RX ORDER — LIDOCAINE HYDROCHLORIDE 40 MG/ML
SOLUTION TOPICAL ONCE
OUTPATIENT
Start: 2024-02-12 | End: 2024-02-12

## 2024-02-12 RX ORDER — LIDOCAINE HYDROCHLORIDE 20 MG/ML
JELLY TOPICAL ONCE
OUTPATIENT
Start: 2024-02-12 | End: 2024-02-12

## 2024-02-12 RX ORDER — LIDOCAINE HYDROCHLORIDE 40 MG/ML
SOLUTION TOPICAL ONCE
Status: COMPLETED | OUTPATIENT
Start: 2024-02-12 | End: 2024-02-12

## 2024-02-12 RX ORDER — TRIAMCINOLONE ACETONIDE 1 MG/G
OINTMENT TOPICAL ONCE
OUTPATIENT
Start: 2024-02-12 | End: 2024-02-12

## 2024-02-12 RX ORDER — LIDOCAINE 40 MG/G
CREAM TOPICAL ONCE
OUTPATIENT
Start: 2024-02-12 | End: 2024-02-12

## 2024-02-12 RX ORDER — CLOBETASOL PROPIONATE 0.5 MG/G
OINTMENT TOPICAL ONCE
OUTPATIENT
Start: 2024-02-12 | End: 2024-02-12

## 2024-02-12 RX ORDER — IBUPROFEN 200 MG
TABLET ORAL ONCE
OUTPATIENT
Start: 2024-02-12 | End: 2024-02-12

## 2024-02-12 RX ORDER — GINSENG 100 MG
CAPSULE ORAL ONCE
OUTPATIENT
Start: 2024-02-12 | End: 2024-02-12

## 2024-02-12 RX ORDER — BETAMETHASONE DIPROPIONATE 0.5 MG/G
CREAM TOPICAL ONCE
OUTPATIENT
Start: 2024-02-12 | End: 2024-02-12

## 2024-02-12 RX ADMIN — LIDOCAINE HYDROCHLORIDE: 40 SOLUTION TOPICAL at 14:00

## 2024-02-12 NOTE — PROGRESS NOTES
[facility]   Progress Note and Procedure Note      Rosanne Newton  MEDICAL RECORD NUMBER:  [id number]  AGE: 73 y.o.   GENDER: female  : 1950  EPISODE DATE:  2024    Subjective:     Chief Complaint   Patient presents with    Wound Check     Initial assessment right posterior thigh wound         HISTORY of PRESENT ILLNESS HPI     Rosanne Newton is a 73 y.o. female who presents today for wound/ulcer evaluation.   History of Wound Context: right posterior thigh wound from after sugery and rehab stay from ankle surgery May 2023.  She reports area has stayed open  and area is tender.  No overt signs of infection and she denies constitutional issues.  Her ankle surgery was on the right ankle so she stated she tend to \"scoot\" on that right side possibly aggravating this area.    Wound/Ulcer Pain Timing/Severity: intermittent, mild  Quality of pain: tender  Severity:  2 / 10   Modifying Factors: Pain is relieved/improved with rest  Associated Signs/Symptoms: pain    Ulcer Identification:  Ulcer Type: pressure and traumatic    Contributing Factors: decreased mobility, shear force, and obesity    Acute Wound: N/A not an acute wound and Abrasion  Pt is living with her sister at present and has plans to move back home, but no sure when.  She discussed that recent dexa scans show osteoporosis so she has some concerns about her home set up, stairs and possibility of injury with falling.     PAST MEDICAL HISTORY        Diagnosis Date    Arthritis     Reumitoid    COVID-19 2022    Diabetes mellitus (HCC)     Fracture of radius and ulna 2023    near right wrist    Hyperlipidemia     Hypertension     Kidney stones     Rheumatoid arthritis (HCC)        PAST SURGICAL HISTORY    Past Surgical History:   Procedure Laterality Date    ANKLE FRACTURE SURGERY Right 2023    OPEN REDUCTION INTERNAL FIXATION RIGHT ANKLE TRIMALLEOLAR FRACTURE performed by David Silva MD at [facility]

## 2024-02-12 NOTE — PATIENT INSTRUCTIONS
[facility] Wound Care Physician Orders and Discharge Instructions  [facility]  [address], [address]  [place], [place] [address]  Telephone: [phone]      FAX [phone]  MONDAY - THURSDAY 8:00 am - 4:30 pm and Friday 8:00 am - 12:00 pm.        NAME:  Rosanne Newton  YOB: 1950  MEDICAL RECORD NUMBER:  [id number]  DATE:  2/12/2024      Return Appointment:  [x] Return Appointment: With Jayne Kumar CNP in  1 Week(s)  [] Wound and dressing supply provider:   [] ECF or Home Healthcare:  [] Wound Assessment: [] Physician or NP scheduled for Wound Assessment:   [] Orders placed during your visit:      Important Reminders:   Please wash hands with soap and water before and after every dressing change.  Do not scrub wounds.  Keep wounds dry in shower unless otherwise instructed by the physician.  SMOKING can slow would healing. Stop smoking as soon as possible to improve healing and prevent further complications associated with smoking.      Khadijah-Wound Topical Treatments:  Do not apply lotions, creams, or ointments to wound bed unless directed.   [] Apply moisturizing lotion to skin surrounding the wound prior to dressing change.  [] Apply antifungal ointment to skin surrounding the wound prior to dressing change.  [] Apply thin film of no sting moisture barrier ointment to skin immediately around      wound.  [] Other:       Wound Location: Right Posterior Thigh    Wound Cleansing: Normal Saline    Primary Dressing:  [x] Mepilex Border        Dressing Frequency:  [x] Change every 2-3 Days       Pressure Relief and Off Loading: Obtain an offloading cushion  [x] Off-loading when [x] walking  [x] in bed [x] sitting   Turn every 2 hours when in bed   Avoid putting direct pressure on the site of the wound. Limit side lying to 30 degree tilt. Limit elevating the head of the bed greater than 30 degrees.                                      [x] Assistive Devices   Cane  Use

## 2024-02-19 ENCOUNTER — HOSPITAL ENCOUNTER (OUTPATIENT)
Dept: WOUND CARE | Age: 74
Discharge: HOME | End: 2024-02-19
Attending: NURSE PRACTITIONER
Payer: MEDICARE

## 2024-02-19 VITALS
TEMPERATURE: 97.1 F | DIASTOLIC BLOOD PRESSURE: 72 MMHG | HEART RATE: 82 BPM | SYSTOLIC BLOOD PRESSURE: 121 MMHG | RESPIRATION RATE: 18 BRPM

## 2024-02-19 DIAGNOSIS — S71.101A OPEN WOUND OF RIGHT THIGH, INITIAL ENCOUNTER: Primary | ICD-10-CM

## 2024-02-19 PROCEDURE — 11042 DBRDMT SUBQ TIS 1ST 20SQCM/<: CPT | Performed by: NURSE PRACTITIONER

## 2024-02-19 PROCEDURE — 11042 DBRDMT SUBQ TIS 1ST 20SQCM/<: CPT

## 2024-02-19 RX ORDER — LIDOCAINE HYDROCHLORIDE 20 MG/ML
JELLY TOPICAL ONCE
OUTPATIENT
Start: 2024-02-19 | End: 2024-02-19

## 2024-02-19 RX ORDER — BETAMETHASONE DIPROPIONATE 0.5 MG/G
CREAM TOPICAL ONCE
OUTPATIENT
Start: 2024-02-19 | End: 2024-02-19

## 2024-02-19 RX ORDER — IBUPROFEN 200 MG
TABLET ORAL ONCE
OUTPATIENT
Start: 2024-02-19 | End: 2024-02-19

## 2024-02-19 RX ORDER — BACITRACIN ZINC AND POLYMYXIN B SULFATE 500; 1000 [USP'U]/G; [USP'U]/G
OINTMENT TOPICAL ONCE
OUTPATIENT
Start: 2024-02-19 | End: 2024-02-19

## 2024-02-19 RX ORDER — GENTAMICIN SULFATE 1 MG/G
OINTMENT TOPICAL ONCE
OUTPATIENT
Start: 2024-02-19 | End: 2024-02-19

## 2024-02-19 RX ORDER — LIDOCAINE HYDROCHLORIDE 40 MG/ML
SOLUTION TOPICAL ONCE
OUTPATIENT
Start: 2024-02-19 | End: 2024-02-19

## 2024-02-19 RX ORDER — CLOBETASOL PROPIONATE 0.5 MG/G
OINTMENT TOPICAL ONCE
OUTPATIENT
Start: 2024-02-19 | End: 2024-02-19

## 2024-02-19 RX ORDER — LIDOCAINE 40 MG/G
CREAM TOPICAL ONCE
OUTPATIENT
Start: 2024-02-19 | End: 2024-02-19

## 2024-02-19 RX ORDER — TRIAMCINOLONE ACETONIDE 1 MG/G
OINTMENT TOPICAL ONCE
OUTPATIENT
Start: 2024-02-19 | End: 2024-02-19

## 2024-02-19 RX ORDER — GINSENG 100 MG
CAPSULE ORAL ONCE
OUTPATIENT
Start: 2024-02-19 | End: 2024-02-19

## 2024-02-19 RX ORDER — LIDOCAINE 50 MG/G
OINTMENT TOPICAL ONCE
OUTPATIENT
Start: 2024-02-19 | End: 2024-02-19

## 2024-02-19 RX ORDER — LIDOCAINE HYDROCHLORIDE 40 MG/ML
SOLUTION TOPICAL ONCE
Status: COMPLETED | OUTPATIENT
Start: 2024-02-19 | End: 2024-02-19

## 2024-02-19 RX ORDER — SODIUM CHLOR/HYPOCHLOROUS ACID 0.033 %
SOLUTION, IRRIGATION IRRIGATION ONCE
OUTPATIENT
Start: 2024-02-19 | End: 2024-02-19

## 2024-02-19 RX ADMIN — LIDOCAINE HYDROCHLORIDE: 40 SOLUTION TOPICAL at 14:15

## 2024-02-19 ASSESSMENT — PAIN SCALES - GENERAL: PAINLEVEL_OUTOF10: 0

## 2024-02-19 NOTE — PATIENT INSTRUCTIONS
as instructed by the provider      Activity: Activity as Tolerated      Dietary:   Continue your diet as tolerated.  Protein is a key nutrient in helping to repair damaged tissue and promote new tissue growth. Good sources of protein include milk, yogurt, cheese, fish, lean meat and beans.  If you are DIABETIC, having diabetes can make it hard for wounds to heal. Try to keep your blood sugar within it's target range.  Limit Sodium, Alcohol and Sugar.    Pain:   Please Note some pain, drainage and/or bleeding might be expected after seeing the provider. TO HELP ALLEVIATE PAIN WE RECOMMEND THE FOLLOWING  Elevate the affected limb.  Use over the counter medications as permitted by your family doctor.  For Persistent Pain not relieved by the above interventions, please notify your family doctor.        : Charity     Electronically signed by Charity Olson RN on 2/19/2024 at 3:07 PM       Wound Care Center Information: Should you experience any significant changes in your wound(s) or have questions about your wound care, please contact the [facility] Wound Center at [phone] MONDAY - THURSDAY 8:00 am - 4:30 pm and Friday 8:00 am - 12:30 pm.  If you need help with your wound outside these hours and cannot wait until we are again available, contact your PCP or go to the hospital emergency room.     PLEASE NOTE: IF YOU ARE UNABLE TO OBTAIN WOUND SUPPLIES, CONTINUE TO USE THE SUPPLIES YOU HAVE AVAILABLE UNTIL YOU ARE ABLE TO REACH US. IT IS MOST IMPORTANT TO KEEP THE WOUND COVERED AT ALL TIMES.         Physician Signature:_______________________    Date: ___________ Time:  ____________          Jayne Kumar CNP

## 2024-02-20 NOTE — PROGRESS NOTES
[facility]   Progress Note and Procedure Note      Rosanne Newton  MEDICAL RECORD NUMBER:  [id number]  AGE: 73 y.o.   GENDER: female  : 1950  EPISODE DATE:  2024    Subjective:     Chief Complaint   Patient presents with    Wound Check     Follow-up visit for a wound to the right thigh.         HISTORY of PRESENT ILLNESS HPI  Rosanne Newton is a 73 y.o. female who presents today for wound/ulcer evaluation.   History of Wound Context: right posterior thigh wound from after sugery and rehab stay from ankle surgery May 2023.  She reports area has stayed open  and area is tender.  No overt signs of infection and she denies constitutional issues.  Her ankle surgery was on the right ankle so she stated she tend to \"scoot\" on that right side possibly aggravating this area.    Wound/Ulcer Pain Timing/Severity: intermittent, mild  Quality of pain: tender  Severity:  2 / 10   Modifying Factors: Pain is relieved/improved with rest  Associated Signs/Symptoms: pain     Ulcer Identification:  Ulcer Type: pressure and traumatic     Contributing Factors: decreased mobility, shear force, and obesity     Acute Wound: N/A not an acute wound or abrasion  Pt is living with her sister at present and has plans to move back home, but no sure when.  She discussed that recent dexa scans show osteoporosis so she has some concerns about her home set up, stairs and possibility of injury with falling.      PAST MEDICAL HISTORY        Diagnosis Date    Arthritis     Reumitoid    COVID-19 2022    Diabetes mellitus (HCC)     Fracture of radius and ulna 2023    near right wrist    Hyperlipidemia     Hypertension     Kidney stones     Rheumatoid arthritis (HCC)        PAST SURGICAL HISTORY    Past Surgical History:   Procedure Laterality Date    ANKLE FRACTURE SURGERY Right 2023    OPEN REDUCTION INTERNAL FIXATION RIGHT ANKLE TRIMALLEOLAR FRACTURE performed by David [facility] MD at [facility]

## 2024-03-04 ENCOUNTER — APPOINTMENT (OUTPATIENT)
Dept: WOUND CARE | Age: 74
End: 2024-03-04
Attending: NURSE PRACTITIONER
Payer: MEDICARE

## 2024-03-11 ENCOUNTER — HOSPITAL ENCOUNTER (OUTPATIENT)
Dept: WOUND CARE | Age: 74
Discharge: HOME | End: 2024-03-11
Attending: NURSE PRACTITIONER
Payer: MEDICARE

## 2024-03-11 VITALS
HEART RATE: 77 BPM | RESPIRATION RATE: 18 BRPM | DIASTOLIC BLOOD PRESSURE: 75 MMHG | TEMPERATURE: 97.4 F | SYSTOLIC BLOOD PRESSURE: 134 MMHG

## 2024-03-11 DIAGNOSIS — S71.101A OPEN WOUND OF RIGHT THIGH, INITIAL ENCOUNTER: Primary | ICD-10-CM

## 2024-03-11 PROCEDURE — 11042 DBRDMT SUBQ TIS 1ST 20SQCM/<: CPT

## 2024-03-11 PROCEDURE — 97597 DBRDMT OPN WND 1ST 20 CM/<: CPT | Performed by: NURSE PRACTITIONER

## 2024-03-11 RX ORDER — GENTAMICIN SULFATE 1 MG/G
OINTMENT TOPICAL ONCE
OUTPATIENT
Start: 2024-03-11 | End: 2024-03-11

## 2024-03-11 RX ORDER — IBUPROFEN 200 MG
TABLET ORAL ONCE
OUTPATIENT
Start: 2024-03-11 | End: 2024-03-11

## 2024-03-11 RX ORDER — LIDOCAINE 40 MG/G
CREAM TOPICAL ONCE
OUTPATIENT
Start: 2024-03-11 | End: 2024-03-11

## 2024-03-11 RX ORDER — GINSENG 100 MG
CAPSULE ORAL ONCE
OUTPATIENT
Start: 2024-03-11 | End: 2024-03-11

## 2024-03-11 RX ORDER — LIDOCAINE HYDROCHLORIDE 40 MG/ML
SOLUTION TOPICAL ONCE
Status: COMPLETED | OUTPATIENT
Start: 2024-03-11 | End: 2024-03-11

## 2024-03-11 RX ORDER — CLOBETASOL PROPIONATE 0.5 MG/G
OINTMENT TOPICAL ONCE
OUTPATIENT
Start: 2024-03-11 | End: 2024-03-11

## 2024-03-11 RX ORDER — TRIAMCINOLONE ACETONIDE 1 MG/G
OINTMENT TOPICAL ONCE
OUTPATIENT
Start: 2024-03-11 | End: 2024-03-11

## 2024-03-11 RX ORDER — BETAMETHASONE DIPROPIONATE 0.5 MG/G
CREAM TOPICAL ONCE
OUTPATIENT
Start: 2024-03-11 | End: 2024-03-11

## 2024-03-11 RX ORDER — LIDOCAINE HYDROCHLORIDE 40 MG/ML
SOLUTION TOPICAL ONCE
OUTPATIENT
Start: 2024-03-11 | End: 2024-03-11

## 2024-03-11 RX ORDER — LIDOCAINE 50 MG/G
OINTMENT TOPICAL ONCE
OUTPATIENT
Start: 2024-03-11 | End: 2024-03-11

## 2024-03-11 RX ORDER — LIDOCAINE HYDROCHLORIDE 20 MG/ML
JELLY TOPICAL ONCE
OUTPATIENT
Start: 2024-03-11 | End: 2024-03-11

## 2024-03-11 RX ORDER — SODIUM CHLOR/HYPOCHLOROUS ACID 0.033 %
SOLUTION, IRRIGATION IRRIGATION ONCE
OUTPATIENT
Start: 2024-03-11 | End: 2024-03-11

## 2024-03-11 RX ORDER — BACITRACIN ZINC AND POLYMYXIN B SULFATE 500; 1000 [USP'U]/G; [USP'U]/G
OINTMENT TOPICAL ONCE
OUTPATIENT
Start: 2024-03-11 | End: 2024-03-11

## 2024-03-11 RX ADMIN — LIDOCAINE HYDROCHLORIDE 5 ML: 40 SOLUTION TOPICAL at 14:19

## 2024-03-11 ASSESSMENT — PAIN SCALES - GENERAL: PAINLEVEL_OUTOF10: 0

## 2024-03-11 NOTE — PATIENT INSTRUCTIONS
Assistive Devices   Cane  Use as instructed by the provider      Activity: Activity as Tolerated      Dietary:   Continue your diet as tolerated.  Protein is a key nutrient in helping to repair damaged tissue and promote new tissue growth. Good sources of protein include milk, yogurt, cheese, fish, lean meat and beans.  If you are DIABETIC, having diabetes can make it hard for wounds to heal. Try to keep your blood sugar within it's target range.  Limit Sodium, Alcohol and Sugar.    Pain:   Please Note some pain, drainage and/or bleeding might be expected after seeing the provider. TO HELP ALLEVIATE PAIN WE RECOMMEND THE FOLLOWING  Elevate the affected limb.  Use over the counter medications as permitted by your family doctor.  For Persistent Pain not relieved by the above interventions, please notify your family doctor.        : Charity     Electronically signed by Charity Olson RN on 3/11/2024 at 2:34 PM       Wound Care Center Information: Should you experience any significant changes in your wound(s) or have questions about your wound care, please contact the [facility] Wound Center at [phone] MONDAY - THURSDAY 8:00 am - 4:30 pm and Friday 8:00 am - 12:30 pm.  If you need help with your wound outside these hours and cannot wait until we are again available, contact your PCP or go to the hospital emergency room.     PLEASE NOTE: IF YOU ARE UNABLE TO OBTAIN WOUND SUPPLIES, CONTINUE TO USE THE SUPPLIES YOU HAVE AVAILABLE UNTIL YOU ARE ABLE TO REACH US. IT IS MOST IMPORTANT TO KEEP THE WOUND COVERED AT ALL TIMES.         Physician Signature:_______________________    Date: ___________ Time:  ____________          Jayne Kumar CNP

## 2024-03-12 NOTE — PROGRESS NOTES
[facility]   Progress Note and Procedure Note      Rosanne Newton  MEDICAL RECORD NUMBER:  [id number]  AGE: 73 y.o.   GENDER: female  : 1950  EPISODE DATE:  3/11/2024    Subjective:     Chief Complaint   Patient presents with    Wound Check     Follow up visit right thigh wound         HISTORY of PRESENT ILLNESS HPI  Rosanne Newton is a 73 y.o. female who presents today for wound/ulcer evaluation.   History of Wound Context: right posterior thigh wound from after sugery and rehab stay from ankle surgery May 2023.  She reports area has stayed open  and area is tender.  No overt signs of infection and she denies constitutional issues.  Her ankle surgery was on the right ankle so she stated she tend to \"scoot\" on that right side possibly aggravating this area.    Wound/Ulcer Pain Timing/Severity: intermittent, mild  Quality of pain: tender  Severity:  2 / 10   Modifying Factors: Pain is relieved/improved with rest  Associated Signs/Symptoms: pain     Ulcer Identification:  Ulcer Type: pressure and traumatic     Contributing Factors: decreased mobility, shear force, and obesity     Acute Wound: N/A not an acute wound or abrasion  Pt is living with her sister at present and has plans to move back home, but no sure when.  She discussed that recent dexa scans show osteoporosis so she has some concerns about her home set up, stairs and possibility of injury with falling.     2024:  Pt presented today with wound almost resurfaced, but still with small amount of bleeding.  She denies constitutional issues and no overt signs of infection.  Wound and wound edges palpated and not able to detect any fluid collections. No erythema or warmth but still has direct wound discomfort.  She is applying dressings herself at home.       PAST MEDICAL HISTORY        Diagnosis Date    Arthritis     Reumitoid    COVID-19 2022    Diabetes mellitus (HCC)     Fracture of radius and ulna 2023

## 2024-03-25 ENCOUNTER — HOSPITAL ENCOUNTER (OUTPATIENT)
Dept: WOUND CARE | Age: 74
Discharge: HOME | End: 2024-03-25
Attending: NURSE PRACTITIONER
Payer: MEDICARE

## 2024-03-25 VITALS
HEART RATE: 75 BPM | DIASTOLIC BLOOD PRESSURE: 84 MMHG | RESPIRATION RATE: 18 BRPM | TEMPERATURE: 97.4 F | SYSTOLIC BLOOD PRESSURE: 144 MMHG

## 2024-03-25 DIAGNOSIS — S71.101A OPEN WOUND OF RIGHT THIGH, INITIAL ENCOUNTER: Primary | ICD-10-CM

## 2024-03-25 PROCEDURE — 99213 OFFICE O/P EST LOW 20 MIN: CPT

## 2024-03-25 PROCEDURE — 99212 OFFICE O/P EST SF 10 MIN: CPT | Performed by: NURSE PRACTITIONER

## 2024-03-25 RX ORDER — LIDOCAINE HYDROCHLORIDE 20 MG/ML
JELLY TOPICAL ONCE
OUTPATIENT
Start: 2024-03-25 | End: 2024-03-25

## 2024-03-25 RX ORDER — GINSENG 100 MG
CAPSULE ORAL ONCE
OUTPATIENT
Start: 2024-03-25 | End: 2024-03-25

## 2024-03-25 RX ORDER — LIDOCAINE HYDROCHLORIDE 40 MG/ML
SOLUTION TOPICAL ONCE
Status: COMPLETED | OUTPATIENT
Start: 2024-03-25 | End: 2024-03-25

## 2024-03-25 RX ORDER — GENTAMICIN SULFATE 1 MG/G
OINTMENT TOPICAL ONCE
OUTPATIENT
Start: 2024-03-25 | End: 2024-03-25

## 2024-03-25 RX ORDER — BETAMETHASONE DIPROPIONATE 0.5 MG/G
CREAM TOPICAL ONCE
OUTPATIENT
Start: 2024-03-25 | End: 2024-03-25

## 2024-03-25 RX ORDER — TRIAMCINOLONE ACETONIDE 1 MG/G
OINTMENT TOPICAL ONCE
OUTPATIENT
Start: 2024-03-25 | End: 2024-03-25

## 2024-03-25 RX ORDER — LIDOCAINE 40 MG/G
CREAM TOPICAL ONCE
OUTPATIENT
Start: 2024-03-25 | End: 2024-03-25

## 2024-03-25 RX ORDER — IBUPROFEN 200 MG
TABLET ORAL ONCE
OUTPATIENT
Start: 2024-03-25 | End: 2024-03-25

## 2024-03-25 RX ORDER — LIDOCAINE 50 MG/G
OINTMENT TOPICAL ONCE
OUTPATIENT
Start: 2024-03-25 | End: 2024-03-25

## 2024-03-25 RX ORDER — SODIUM CHLOR/HYPOCHLOROUS ACID 0.033 %
SOLUTION, IRRIGATION IRRIGATION ONCE
OUTPATIENT
Start: 2024-03-25 | End: 2024-03-25

## 2024-03-25 RX ORDER — LIDOCAINE HYDROCHLORIDE 40 MG/ML
SOLUTION TOPICAL ONCE
OUTPATIENT
Start: 2024-03-25 | End: 2024-03-25

## 2024-03-25 RX ORDER — CLOBETASOL PROPIONATE 0.5 MG/G
OINTMENT TOPICAL ONCE
OUTPATIENT
Start: 2024-03-25 | End: 2024-03-25

## 2024-03-25 RX ORDER — BACITRACIN ZINC AND POLYMYXIN B SULFATE 500; 1000 [USP'U]/G; [USP'U]/G
OINTMENT TOPICAL ONCE
OUTPATIENT
Start: 2024-03-25 | End: 2024-03-25

## 2024-03-25 RX ADMIN — LIDOCAINE HYDROCHLORIDE: 40 SOLUTION TOPICAL at 14:17

## 2024-03-25 ASSESSMENT — PAIN DESCRIPTION - DESCRIPTORS: DESCRIPTORS: DISCOMFORT;SHARP

## 2024-03-25 ASSESSMENT — PAIN DESCRIPTION - FREQUENCY: FREQUENCY: INTERMITTENT

## 2024-03-25 ASSESSMENT — PAIN - FUNCTIONAL ASSESSMENT: PAIN_FUNCTIONAL_ASSESSMENT: PREVENTS OR INTERFERES SOME ACTIVE ACTIVITIES AND ADLS

## 2024-03-25 ASSESSMENT — PAIN DESCRIPTION - ORIENTATION: ORIENTATION: RIGHT

## 2024-03-25 ASSESSMENT — PAIN DESCRIPTION - ONSET: ONSET: ON-GOING

## 2024-03-25 ASSESSMENT — PAIN DESCRIPTION - LOCATION: LOCATION: LEG

## 2024-03-25 ASSESSMENT — PAIN DESCRIPTION - PAIN TYPE: TYPE: ACUTE PAIN

## 2024-03-25 ASSESSMENT — PAIN SCALES - GENERAL: PAINLEVEL_OUTOF10: 1

## 2024-03-25 NOTE — PATIENT INSTRUCTIONS
[facility] Wound Care Physician Orders and Discharge Instructions  [facility]  [address], [address]  [place], [place] [address]  Telephone: [phone]      FAX [phone]  MONDAY - THURSDAY 8:00 am - 4:30 pm and Friday 8:00 am - 12:00 pm.        NAME:  Rosanne Newton  YOB: 1950  MEDICAL RECORD NUMBER:  [id number]  DATE:  3/25/2024      Return Appointment:  [x] Return Appointment: With Jayne Kumar CNP in  1 Week(s)  [] Wound and dressing supply provider:   [] ECF or Home Healthcare:  [] Wound Assessment: [] Physician or NP scheduled for Wound Assessment:   [] Orders placed during your visit:      Important Reminders:   Please wash hands with soap and water before and after every dressing change.  Do not scrub wounds.  Keep wounds dry in shower unless otherwise instructed by the physician.  SMOKING can slow would healing. Stop smoking as soon as possible to improve healing and prevent further complications associated with smoking.      Khadijah-Wound Topical Treatments:  Do not apply lotions, creams, or ointments to wound bed unless directed.   [] Apply moisturizing lotion to skin surrounding the wound prior to dressing change.  [] Apply antifungal ointment to skin surrounding the wound prior to dressing change.  [] Apply thin film of no sting moisture barrier ointment to skin immediately around      wound.  [] Other:       Wound Location: Right Posterior Thigh    Wound Cleansing: Normal Saline    Primary Dressing:  [x] Moistened Endoform AM then Mepilex Border (4 x 4)        Dressing Frequency:  [x] Do Not Change       Pressure Relief and Off Loading: Obtain an offloading cushion  [x] Off-loading when [x] walking  [x] in bed [x] sitting   Turn every 2 hours when in bed   Avoid putting direct pressure on the site of the wound. Limit side lying to 30 degree tilt. Limit elevating the head of the bed greater than 30 degrees.                                      [x]

## 2024-03-26 NOTE — PROGRESS NOTES
[facility]   Progress Note and Procedure Note      Rosanne Newton  MEDICAL RECORD NUMBER:  [id number]  AGE: 73 y.o.   GENDER: female  : 1950  EPISODE DATE:  3/25/2024    Subjective:     Chief Complaint   Patient presents with    Wound Check     Follow up for right thigh wound          HISTORY of PRESENT ILLNESS HPI  Rosanne Newton is a 73 y.o. female who presents today for wound/ulcer evaluation.   History of Wound Context: right posterior thigh wound from after sugery and rehab stay from ankle surgery May 2023.  She reports area has stayed open  and area is tender.  No overt signs of infection and she denies constitutional issues.  Her ankle surgery was on the right ankle so she stated she tend to \"scoot\" on that right side possibly aggravating this area.    Wound/Ulcer Pain Timing/Severity: intermittent, mild  Quality of pain: tender  Severity:  2 / 10   Modifying Factors: Pain is relieved/improved with rest  Associated Signs/Symptoms: pain     Ulcer Identification:  Ulcer Type: pressure and traumatic     Contributing Factors: decreased mobility, shear force, and obesity     Acute Wound: N/A not an acute wound or abrasion  Pt is living with her sister at present and has plans to move back home, but no sure when.  She discussed that recent dexa scans show osteoporosis so she has some concerns about her home set up, stairs and possibility of injury with falling.      2024:  Pt presented today with wound almost resurfaced, but still with small amount of bleeding.  She denies constitutional issues and no overt signs of infection.  Wound and wound edges palpated and not able to detect any fluid collections. No erythema or warmth but still has direct wound discomfort.  She is applying   dressings herself at home.      2024: Pt presents today with wound more shallow, noting epithelialization on wound edges.  No overt signs of infection, is tender with pressure and

## 2024-04-01 ENCOUNTER — HOSPITAL ENCOUNTER (OUTPATIENT)
Dept: WOUND CARE | Age: 74
Discharge: HOME | End: 2024-04-01
Attending: NURSE PRACTITIONER
Payer: MEDICARE

## 2024-04-01 VITALS
DIASTOLIC BLOOD PRESSURE: 77 MMHG | RESPIRATION RATE: 18 BRPM | HEART RATE: 80 BPM | SYSTOLIC BLOOD PRESSURE: 132 MMHG | TEMPERATURE: 97.7 F

## 2024-04-01 DIAGNOSIS — S71.101A OPEN WOUND OF RIGHT THIGH, INITIAL ENCOUNTER: Primary | ICD-10-CM

## 2024-04-01 PROCEDURE — 99212 OFFICE O/P EST SF 10 MIN: CPT | Performed by: NURSE PRACTITIONER

## 2024-04-01 PROCEDURE — 99212 OFFICE O/P EST SF 10 MIN: CPT

## 2024-04-01 RX ORDER — LIDOCAINE 50 MG/G
OINTMENT TOPICAL ONCE
Status: CANCELLED | OUTPATIENT
Start: 2024-04-01 | End: 2024-04-01

## 2024-04-01 RX ORDER — GINSENG 100 MG
CAPSULE ORAL ONCE
Status: CANCELLED | OUTPATIENT
Start: 2024-04-01 | End: 2024-04-01

## 2024-04-01 RX ORDER — IBUPROFEN 200 MG
TABLET ORAL ONCE
Status: CANCELLED | OUTPATIENT
Start: 2024-04-01 | End: 2024-04-01

## 2024-04-01 RX ORDER — BACITRACIN ZINC AND POLYMYXIN B SULFATE 500; 1000 [USP'U]/G; [USP'U]/G
OINTMENT TOPICAL ONCE
Status: CANCELLED | OUTPATIENT
Start: 2024-04-01 | End: 2024-04-01

## 2024-04-01 RX ORDER — TRIAMCINOLONE ACETONIDE 1 MG/G
OINTMENT TOPICAL ONCE
Status: CANCELLED | OUTPATIENT
Start: 2024-04-01 | End: 2024-04-01

## 2024-04-01 RX ORDER — LIDOCAINE 40 MG/G
CREAM TOPICAL ONCE
Status: CANCELLED | OUTPATIENT
Start: 2024-04-01 | End: 2024-04-01

## 2024-04-01 RX ORDER — BETAMETHASONE DIPROPIONATE 0.5 MG/G
CREAM TOPICAL ONCE
Status: CANCELLED | OUTPATIENT
Start: 2024-04-01 | End: 2024-04-01

## 2024-04-01 RX ORDER — CLOBETASOL PROPIONATE 0.5 MG/G
OINTMENT TOPICAL ONCE
Status: CANCELLED | OUTPATIENT
Start: 2024-04-01 | End: 2024-04-01

## 2024-04-01 RX ORDER — GENTAMICIN SULFATE 1 MG/G
OINTMENT TOPICAL ONCE
Status: CANCELLED | OUTPATIENT
Start: 2024-04-01 | End: 2024-04-01

## 2024-04-01 RX ORDER — LIDOCAINE HYDROCHLORIDE 20 MG/ML
JELLY TOPICAL ONCE
Status: CANCELLED | OUTPATIENT
Start: 2024-04-01 | End: 2024-04-01

## 2024-04-01 RX ORDER — SODIUM CHLOR/HYPOCHLOROUS ACID 0.033 %
SOLUTION, IRRIGATION IRRIGATION ONCE
Status: CANCELLED | OUTPATIENT
Start: 2024-04-01 | End: 2024-04-01

## 2024-04-01 RX ORDER — LIDOCAINE HYDROCHLORIDE 40 MG/ML
SOLUTION TOPICAL ONCE
Status: CANCELLED | OUTPATIENT
Start: 2024-04-01 | End: 2024-04-01

## 2024-04-01 ASSESSMENT — PAIN SCALES - GENERAL: PAINLEVEL_OUTOF10: 0

## 2024-04-01 NOTE — PLAN OF CARE
Problem: Chronic Conditions and Co-morbidities  Goal: Patient's chronic conditions and co-morbidity symptoms are monitored and maintained or improved  Outcome: Completed     Problem: Wound:  Goal: Will show signs of wound healing; wound closure and no evidence of infection  Description: Will show signs of wound healing; wound closure and no evidence of infection  Outcome: Completed     Problem: Falls - Risk of:  Goal: Will remain free from falls  Description: Will remain free from falls  Outcome: Completed     Problem: Blood Glucose:  Goal: Ability to maintain appropriate glucose levels will improve  Description: Ability to maintain appropriate glucose levels will improve  Outcome: Completed

## 2024-04-01 NOTE — PROGRESS NOTES
[facility]   Progress Note and Procedure Note      Rosanne Newton  MEDICAL RECORD NUMBER:  [id number]  AGE: 73 y.o.   GENDER: female  : 1950  EPISODE DATE:  2024    Subjective:     Chief Complaint   Patient presents with    Wound Check     Follow up for right thigh.          HISTORY of PRESENT ILLNESS HPI  Rosanne Newton is a 73 y.o. female who presents today for wound/ulcer evaluation.   History of Wound Context: right posterior thigh wound from after sugery and rehab stay from ankle surgery May 2023.  She reports area has stayed open  and area is tender.  No overt signs of infection and she denies constitutional issues.  Her ankle surgery was on the right ankle so she stated she tend to \"scoot\" on that right side possibly aggravating this area.    Wound/Ulcer Pain Timing/Severity: intermittent, mild  Quality of pain: tender  Severity:  2 / 10   Modifying Factors: Pain is relieved/improved with rest  Associated Signs/Symptoms: pain     Ulcer Identification:  Ulcer Type: pressure and traumatic     Contributing Factors: decreased mobility, shear force, and obesity     Acute Wound: N/A not an acute wound or abrasion  Pt is living with her sister at present and has plans to move back home, but no sure when.  She discussed that recent dexa scans show osteoporosis so she has some concerns about her home set up, stairs and possibility of injury with falling.      2024:  Pt presented today with wound almost resurfaced, but still with small amount of bleeding.  She denies constitutional issues and no overt signs of infection.  Wound and wound edges palpated and not able to detect any fluid collections. No erythema or warmth but still has direct wound discomfort.  She is applying   dressings herself at home.       2024: Pt presents today with wound more shallow, noting epithelialization on wound edges.  No overt signs of infection, is tender with pressure and only

## 2024-04-01 NOTE — PATIENT INSTRUCTIONS
DISCHARGE INSTRUCTIONS  Wound Clinic Physician Orders   [facility]  [address]  [place], [place] [address]  Telephone: [phone]      FAX [phone]    NAME:  Rosanne Newton  YOB: 1950  MEDICAL RECORD NUMBER:  [id number]  DATE:  4/1/2024    Congratulations!  You have completed your treatment.       Return to your Primary Care Physician for all your health issues.   Resume your ordinary activities as tolerated.   Take your medications as prescribed by your primary care physician.   Check your skin daily for cracks, bruises, sores, or dryness. Use a moisturizer as needed.   Clean and dry your skin, using mild soap and warm water (not hot).   Avoid alcohol and caffeine and do not smoke.  Maintain a nutritious diet.  Avoid pressure on your wound site. Keep your legs elevated above the level of the heart whenever possible.  Continue to use wraps/stockings/compression as prescribed.  Replace compression stockings every four to six months as needed to ensure proper fit.   Wear well-fitting shoes and leg garments.      Physician Signature:______________________    Date: ___________ Time:  ____________    Jayne Kumar CNP            Electronically signed by Charity Olson RN on 4/1/2024 at 2:40 PM

## (undated) DEVICE — GLOVE SURG SZ 85 L12IN FNGR ORTHO 126MIL CRM LTX FREE

## (undated) DEVICE — GLOVE SURG SZ 8 L12IN FNGR THK79MIL GRN LTX FREE

## (undated) DEVICE — SYRINGE IRRIG 60ML SFT PLIABLE BLB EZ TO GRP 1 HND USE W/

## (undated) DEVICE — APPLICATOR GRN CHLORAPREP 2% CHG 70

## (undated) DEVICE — BIT DRL L110MM DIA3.5MM QUIK CPL W/O STP REUSE

## (undated) DEVICE — Device: Brand: MEDICAL ACTION INDUSTRIES

## (undated) DEVICE — GAUZE,SPONGE,4"X4",8PLY,STRL,LF,10/TRAY: Brand: MEDLINE

## (undated) DEVICE — DRESSING,GAUZE,XEROFORM,CURAD,1"X8",ST: Brand: CURAD

## (undated) DEVICE — SINGLE USE DEVICE INTENDED TO COVER EXPOSED ENDS OF ORTHOPEDIC PIN AND K-WIRES TO HELP PROTECT THE EXPOSED WIRE FROM SNAGGING ON CLOTHING.: Brand: OXBORO™ PIN COVER

## (undated) DEVICE — SUTURE VCRL + SZ 3-0 L18IN ABSRB UD SH 1/2 CIR TAPERCUT NDL VCP864D

## (undated) DEVICE — HAND AND ELBOW: Brand: MEDLINE INDUSTRIES, INC.

## (undated) DEVICE — NEPTUNE E-SEP SMOKE EVACUATION PENCIL, COATED, 70MM BLADE, PUSH BUTTON SWITCH: Brand: NEPTUNE E-SEP

## (undated) DEVICE — SPONGE GZ W4XL4IN COT 12 PLY TYP VII WVN C FLD DSGN STERILE

## (undated) DEVICE — C-ARM PACK: Brand: C-ARM COVER

## (undated) DEVICE — MEDICINE CUP, GRADUATED, STER: Brand: MEDLINE

## (undated) DEVICE — CANISTER, RIGID, 1200CC: Brand: MEDLINE INDUSTRIES, INC.

## (undated) DEVICE — HYPODERMIC SAFETY NEEDLE: Brand: MAGELLAN

## (undated) DEVICE — INTENDED FOR TISSUE SEPARATION, AND OTHER PROCEDURES THAT REQUIRE A SHARP SURGICAL BLADE TO PUNCTURE OR CUT.: Brand: BARD-PARKER ® STAINLESS STEEL BLADES

## (undated) DEVICE — SYRINGE 60ML BULB IRRIG ST LF

## (undated) DEVICE — LOWER EXTREMITY: Brand: MEDLINE INDUSTRIES, INC.

## (undated) DEVICE — BIT DRL L100MM DIA2MM QUIK CPL W/O STP REUSE

## (undated) DEVICE — GLOVE ORANGE PI 7 1/2   MSG9075

## (undated) DEVICE — EZE-BAND LF ST 4X5.5 36/CS: Brand: EZE-BAND LF ST 4X5.5 36/CS

## (undated) DEVICE — SUTURE VCRL + 1 L27IN ABSRB UD CT-1 L36MM 1/2 CIR TAPR PNT VCP261H

## (undated) DEVICE — BANDAGE COMPR W4INXL15FT BGE E SGL LAYERED CLP CLSR

## (undated) DEVICE — BLADE ES ELASTOMERIC COAT INSUL DURABLE BEND UPTO 90DEG

## (undated) DEVICE — BANDAGE COMPR W4INXL12FT E DISP ESMARCH EVEN

## (undated) DEVICE — DRAPE,U/ SHT,SPLIT,PLAS,STERIL: Brand: MEDLINE

## (undated) DEVICE — SUTURE VCRL + SZ 2-0 L27IN ABSRB CLR CT-1 1/2 CIR TAPERCUT VCP259H

## (undated) DEVICE — NEEDLE HYPO 22GA L1 1/2IN PIVOTING SHLD FOR LUERLOCK SYR

## (undated) DEVICE — SUTURE ETHLN SZ 3-0 L18IN NONABSORBABLE BLK PS-2 L19MM 3/8 1669H

## (undated) DEVICE — SUTURE NONABSORBABLE MONOFILAMENT 4-0 FS-2 18 IN ETHILON 662H

## (undated) DEVICE — BANDAGE COMPR W4INXL5YD BGE HI E W/ REM CLP SURE-WRAP

## (undated) DEVICE — 4-PORT MANIFOLD: Brand: NEPTUNE 2

## (undated) DEVICE — K WIRE FIX L150MM DIA1.6MM S STL TRCR PNT
Type: IMPLANTABLE DEVICE | Site: ANKLE | Status: NON-FUNCTIONAL
Removed: 2023-05-08

## (undated) DEVICE — C-ARM: Brand: UNBRANDED

## (undated) DEVICE — BIT DRL L110MM DIA2.5MM G QUIK CPL W/O STP REUSE

## (undated) DEVICE — SOLUTION IRRIG 500ML 0.9% SOD CHLO USP POUR PLAS BTL

## (undated) DEVICE — PACK PROCEDURE SURG EXTREMITY MFFOP CUST

## (undated) DEVICE — DRAPE HND W114XL142IN BLU POLYPR W O PCH FEN CRD AND TB HLDR

## (undated) DEVICE — PADDING CAST W4INXL4YD ST COT RAYON MICROPLEATED HIGHLY

## (undated) DEVICE — BANDAGE COMPR W6INXL10YD ST M E WHITE/BEIGE

## (undated) DEVICE — GOWN SIRUS NONREIN LG W/TWL: Brand: MEDLINE INDUSTRIES, INC.

## (undated) DEVICE — ELECTRODE PT RET AD L9FT HI MOIST COND ADH HYDRGEL CORDED

## (undated) DEVICE — BIT DRL L140MM DIA2MM QUIK CPL 3 FLUT CALIB DEPTH MRK W/O

## (undated) DEVICE — BIT DRL L110MM DIA1.8MM QUIK CPL CALIB W/O STP REUSE

## (undated) DEVICE — PADDING UNDERCAST W4INXL4YD 100% COT CRIMPED FINISH WBRL II

## (undated) DEVICE — GLOVE SURG SZ 75 CRM LTX FREE POLYISOPRENE POLYMER BEAD ANTI

## (undated) DEVICE — GLOVE ORANGE PI 8 1/2   MSG9085

## (undated) DEVICE — 3M™ COBAN™ NL STERILE NON-LATEX SELF-ADHERENT WRAP, 2084S, 4 IN X 5 YD (10 CM X 4,5 M), 18 ROLLS/CASE: Brand: 3M™ COBAN™

## (undated) DEVICE — STOCKINETTE,IMPERVIOUS,12X48,STERILE: Brand: MEDLINE

## (undated) DEVICE — BNDG,ELSTC,MATRIX,STRL,4"X5YD,LF,HOOK&LP: Brand: MEDLINE

## (undated) DEVICE — BANDAGE COMPR W3INXL5YD NEON E BRTH SELF ADH WRP COBAN

## (undated) DEVICE — SOLUTION IV IRRIG POUR BRL 0.9% SODIUM CHL 2F7124

## (undated) DEVICE — COTTON UNDERCAST PADDING,CRIMPED FINISH: Brand: WEBRIL

## (undated) DEVICE — TOWEL,OR,DSP,ST,BLUE,STD,4/PK,20PK/CS: Brand: MEDLINE